# Patient Record
Sex: MALE | Race: WHITE | NOT HISPANIC OR LATINO | Employment: OTHER | URBAN - NONMETROPOLITAN AREA
[De-identification: names, ages, dates, MRNs, and addresses within clinical notes are randomized per-mention and may not be internally consistent; named-entity substitution may affect disease eponyms.]

---

## 2017-02-16 ENCOUNTER — TELEPHONE (OUTPATIENT)
Dept: NEUROLOGY | Facility: CLINIC | Age: 57
End: 2017-02-16

## 2017-02-26 ENCOUNTER — HOSPITAL ENCOUNTER (EMERGENCY)
Facility: HOSPITAL | Age: 57
Discharge: HOME | End: 2017-02-27
Attending: FAMILY MEDICINE | Admitting: FAMILY MEDICINE

## 2017-02-26 ENCOUNTER — APPOINTMENT (OUTPATIENT)
Dept: GENERAL RADIOLOGY | Facility: HOSPITAL | Age: 57
End: 2017-02-26

## 2017-02-26 DIAGNOSIS — I95.9 HYPOTENSION, UNSPECIFIED HYPOTENSION TYPE: Primary | ICD-10-CM

## 2017-02-26 DIAGNOSIS — R07.9 CHEST PAIN, UNSPECIFIED TYPE: ICD-10-CM

## 2017-02-26 LAB
ALBUMIN SERPL-MCNC: 4.1 G/DL (ref 3.5–5)
ALBUMIN/GLOB SERPL: 1.3 G/DL (ref 1.1–2.5)
ALP SERPL-CCNC: 76 U/L (ref 24–120)
ALT SERPL W P-5'-P-CCNC: 25 U/L (ref 0–54)
ANION GAP SERPL CALCULATED.3IONS-SCNC: 10 MMOL/L (ref 4–13)
APTT PPP: 30.9 SECONDS (ref 24.1–34.8)
AST SERPL-CCNC: 24 U/L (ref 7–45)
BASOPHILS # BLD AUTO: 0.04 10*3/MM3 (ref 0–0.2)
BASOPHILS NFR BLD AUTO: 0.5 % (ref 0–2)
BILIRUB SERPL-MCNC: 0.4 MG/DL (ref 0.1–1)
BUN BLD-MCNC: 13 MG/DL (ref 5–21)
BUN/CREAT SERPL: 13.8 (ref 7–25)
CALCIUM SPEC-SCNC: 9.3 MG/DL (ref 8.4–10.4)
CHLORIDE SERPL-SCNC: 103 MMOL/L (ref 98–110)
CO2 SERPL-SCNC: 27 MMOL/L (ref 24–31)
CREAT BLD-MCNC: 0.94 MG/DL (ref 0.5–1.4)
D DIMER PPP FEU-MCNC: 0.27 MG/L (FEU) (ref 0–0.5)
DEPRECATED RDW RBC AUTO: 45.4 FL (ref 40–54)
EOSINOPHIL # BLD AUTO: 0.2 10*3/MM3 (ref 0–0.7)
EOSINOPHIL NFR BLD AUTO: 2.4 % (ref 0–4)
ERYTHROCYTE [DISTWIDTH] IN BLOOD BY AUTOMATED COUNT: 13.2 % (ref 12–15)
GFR SERPL CREATININE-BSD FRML MDRD: 83 ML/MIN/1.73
GLOBULIN UR ELPH-MCNC: 3.2 GM/DL
GLUCOSE BLD-MCNC: 116 MG/DL (ref 70–100)
HCT VFR BLD AUTO: 40.5 % (ref 40–52)
HGB BLD-MCNC: 13.5 G/DL (ref 14–18)
IMM GRANULOCYTES # BLD: 0 10*3/MM3 (ref 0–0.03)
IMM GRANULOCYTES NFR BLD: 0 % (ref 0–5)
INR PPP: 0.9 (ref 0.91–1.09)
LYMPHOCYTES # BLD AUTO: 2.82 10*3/MM3 (ref 0.72–4.86)
LYMPHOCYTES NFR BLD AUTO: 34.1 % (ref 15–45)
MCH RBC QN AUTO: 31 PG (ref 28–32)
MCHC RBC AUTO-ENTMCNC: 33.3 G/DL (ref 33–36)
MCV RBC AUTO: 92.9 FL (ref 82–95)
MONOCYTES # BLD AUTO: 0.48 10*3/MM3 (ref 0.19–1.3)
MONOCYTES NFR BLD AUTO: 5.8 % (ref 4–12)
NEUTROPHILS # BLD AUTO: 4.73 10*3/MM3 (ref 1.87–8.4)
NEUTROPHILS NFR BLD AUTO: 57.2 % (ref 39–78)
NT-PROBNP SERPL-MCNC: 70 PG/ML (ref 0–900)
PLATELET # BLD AUTO: 269 10*3/MM3 (ref 130–400)
PMV BLD AUTO: 10.2 FL (ref 6–12)
POTASSIUM BLD-SCNC: 4.1 MMOL/L (ref 3.5–5.3)
PROT SERPL-MCNC: 7.3 G/DL (ref 6.3–8.7)
PROTHROMBIN TIME: 12.4 SECONDS (ref 11.9–14.6)
RBC # BLD AUTO: 4.36 10*6/MM3 (ref 4.8–5.9)
SODIUM BLD-SCNC: 140 MMOL/L (ref 135–145)
TROPONIN I SERPL-MCNC: 0 NG/ML (ref 0–0.07)
WBC NRBC COR # BLD: 8.27 10*3/MM3 (ref 4.8–10.8)

## 2017-02-26 PROCEDURE — 80053 COMPREHEN METABOLIC PANEL: CPT | Performed by: FAMILY MEDICINE

## 2017-02-26 PROCEDURE — 96360 HYDRATION IV INFUSION INIT: CPT

## 2017-02-26 PROCEDURE — 85730 THROMBOPLASTIN TIME PARTIAL: CPT | Performed by: FAMILY MEDICINE

## 2017-02-26 PROCEDURE — 83880 ASSAY OF NATRIURETIC PEPTIDE: CPT | Performed by: FAMILY MEDICINE

## 2017-02-26 PROCEDURE — 85025 COMPLETE CBC W/AUTO DIFF WBC: CPT | Performed by: FAMILY MEDICINE

## 2017-02-26 PROCEDURE — 85379 FIBRIN DEGRADATION QUANT: CPT | Performed by: FAMILY MEDICINE

## 2017-02-26 PROCEDURE — 93010 ELECTROCARDIOGRAM REPORT: CPT | Performed by: INTERNAL MEDICINE

## 2017-02-26 PROCEDURE — 96361 HYDRATE IV INFUSION ADD-ON: CPT

## 2017-02-26 PROCEDURE — 93005 ELECTROCARDIOGRAM TRACING: CPT

## 2017-02-26 PROCEDURE — 84484 ASSAY OF TROPONIN QUANT: CPT

## 2017-02-26 PROCEDURE — 71010 HC CHEST PA OR AP: CPT

## 2017-02-26 PROCEDURE — 93005 ELECTROCARDIOGRAM TRACING: CPT | Performed by: FAMILY MEDICINE

## 2017-02-26 PROCEDURE — 85610 PROTHROMBIN TIME: CPT | Performed by: FAMILY MEDICINE

## 2017-02-26 PROCEDURE — 99284 EMERGENCY DEPT VISIT MOD MDM: CPT

## 2017-02-26 RX ORDER — ACETAMINOPHEN 80 MG/1
243 TABLET, CHEWABLE ORAL ONCE
Status: DISCONTINUED | OUTPATIENT
Start: 2017-02-26 | End: 2017-02-26

## 2017-02-26 RX ORDER — ASPIRIN 81 MG/1
243 TABLET, CHEWABLE ORAL ONCE
Status: COMPLETED | OUTPATIENT
Start: 2017-02-26 | End: 2017-02-26

## 2017-02-26 RX ADMIN — ASPIRIN 81 MG 243 MG: 81 TABLET ORAL at 22:19

## 2017-02-26 RX ADMIN — SODIUM CHLORIDE 1000 ML: 9 INJECTION, SOLUTION INTRAVENOUS at 23:40

## 2017-02-27 VITALS
TEMPERATURE: 97.5 F | OXYGEN SATURATION: 97 % | BODY MASS INDEX: 23.11 KG/M2 | RESPIRATION RATE: 16 BRPM | WEIGHT: 156 LBS | HEIGHT: 69 IN | HEART RATE: 52 BPM | DIASTOLIC BLOOD PRESSURE: 79 MMHG | SYSTOLIC BLOOD PRESSURE: 128 MMHG

## 2017-02-27 LAB
HOLD SPECIMEN: NORMAL
HOLD SPECIMEN: NORMAL
TROPONIN I SERPL-MCNC: 0 NG/ML (ref 0–0.07)
WHOLE BLOOD HOLD SPECIMEN: NORMAL
WHOLE BLOOD HOLD SPECIMEN: NORMAL

## 2017-02-27 PROCEDURE — 93010 ELECTROCARDIOGRAM REPORT: CPT | Performed by: INTERNAL MEDICINE

## 2017-02-27 PROCEDURE — 84484 ASSAY OF TROPONIN QUANT: CPT

## 2017-02-27 RX ORDER — ATENOLOL 25 MG/1
12.5 TABLET ORAL DAILY
Qty: 30 TABLET | Refills: 0 | Status: SHIPPED | OUTPATIENT
Start: 2017-02-27

## 2017-03-03 RX ORDER — LEVETIRACETAM 1000 MG/1
TABLET ORAL
Qty: 60 TABLET | Refills: 5 | Status: SHIPPED | OUTPATIENT
Start: 2017-03-03 | End: 2017-11-28 | Stop reason: SDUPTHER

## 2017-04-06 ENCOUNTER — OFFICE VISIT (OUTPATIENT)
Dept: NEUROLOGY | Facility: CLINIC | Age: 57
End: 2017-04-06

## 2017-04-06 ENCOUNTER — LAB (OUTPATIENT)
Dept: LAB | Facility: HOSPITAL | Age: 57
End: 2017-04-06

## 2017-04-06 VITALS
SYSTOLIC BLOOD PRESSURE: 126 MMHG | DIASTOLIC BLOOD PRESSURE: 100 MMHG | HEIGHT: 69 IN | HEART RATE: 72 BPM | WEIGHT: 154 LBS | RESPIRATION RATE: 16 BRPM | BODY MASS INDEX: 22.81 KG/M2

## 2017-04-06 DIAGNOSIS — G47.33 OSA (OBSTRUCTIVE SLEEP APNEA): ICD-10-CM

## 2017-04-06 DIAGNOSIS — M79.604 PAIN IN BOTH LOWER EXTREMITIES: ICD-10-CM

## 2017-04-06 DIAGNOSIS — G40.309 GENERALIZED SEIZURE DISORDER (HCC): Primary | ICD-10-CM

## 2017-04-06 DIAGNOSIS — M79.605 PAIN IN BOTH LOWER EXTREMITIES: ICD-10-CM

## 2017-04-06 DIAGNOSIS — Z72.0 TOBACCO ABUSE: ICD-10-CM

## 2017-04-06 DIAGNOSIS — E78.5 DYSLIPIDEMIA: ICD-10-CM

## 2017-04-06 DIAGNOSIS — G60.9 IDIOPATHIC PERIPHERAL NEUROPATHY: ICD-10-CM

## 2017-04-06 DIAGNOSIS — G40.309 GENERALIZED SEIZURE DISORDER (HCC): ICD-10-CM

## 2017-04-06 LAB
ALBUMIN SERPL-MCNC: 4.6 G/DL (ref 3.5–5)
ALBUMIN/GLOB SERPL: 1.3 G/DL (ref 1.1–2.5)
ALP SERPL-CCNC: 83 U/L (ref 24–120)
ALT SERPL W P-5'-P-CCNC: 23 U/L (ref 0–54)
ANION GAP SERPL CALCULATED.3IONS-SCNC: 12 MMOL/L (ref 4–13)
AST SERPL-CCNC: 28 U/L (ref 7–45)
BASOPHILS # BLD AUTO: 0.03 10*3/MM3 (ref 0–0.2)
BASOPHILS NFR BLD AUTO: 0.3 % (ref 0–2)
BILIRUB SERPL-MCNC: 0.3 MG/DL (ref 0.1–1)
BUN BLD-MCNC: 15 MG/DL (ref 5–21)
BUN/CREAT SERPL: 16.5 (ref 7–25)
CALCIUM SPEC-SCNC: 9.6 MG/DL (ref 8.4–10.4)
CHLORIDE SERPL-SCNC: 99 MMOL/L (ref 98–110)
CO2 SERPL-SCNC: 30 MMOL/L (ref 24–31)
CREAT BLD-MCNC: 0.91 MG/DL (ref 0.5–1.4)
DEPRECATED RDW RBC AUTO: 45 FL (ref 40–54)
EOSINOPHIL # BLD AUTO: 0.27 10*3/MM3 (ref 0–0.7)
EOSINOPHIL NFR BLD AUTO: 2.9 % (ref 0–4)
ERYTHROCYTE [DISTWIDTH] IN BLOOD BY AUTOMATED COUNT: 13.3 % (ref 12–15)
GFR SERPL CREATININE-BSD FRML MDRD: 86 ML/MIN/1.73
GLOBULIN UR ELPH-MCNC: 3.5 GM/DL
GLUCOSE BLD-MCNC: 101 MG/DL (ref 70–100)
HCT VFR BLD AUTO: 43 % (ref 40–52)
HGB BLD-MCNC: 14.2 G/DL (ref 14–18)
IMM GRANULOCYTES # BLD: 0.01 10*3/MM3 (ref 0–0.03)
IMM GRANULOCYTES NFR BLD: 0.1 % (ref 0–5)
LYMPHOCYTES # BLD AUTO: 3.57 10*3/MM3 (ref 0.72–4.86)
LYMPHOCYTES NFR BLD AUTO: 38.6 % (ref 15–45)
MCH RBC QN AUTO: 30.7 PG (ref 28–32)
MCHC RBC AUTO-ENTMCNC: 33 G/DL (ref 33–36)
MCV RBC AUTO: 92.9 FL (ref 82–95)
MONOCYTES # BLD AUTO: 0.6 10*3/MM3 (ref 0.19–1.3)
MONOCYTES NFR BLD AUTO: 6.5 % (ref 4–12)
NEUTROPHILS # BLD AUTO: 4.76 10*3/MM3 (ref 1.87–8.4)
NEUTROPHILS NFR BLD AUTO: 51.6 % (ref 39–78)
PLATELET # BLD AUTO: 240 10*3/MM3 (ref 130–400)
PMV BLD AUTO: 10.4 FL (ref 6–12)
POTASSIUM BLD-SCNC: 4.1 MMOL/L (ref 3.5–5.3)
PROT SERPL-MCNC: 8.1 G/DL (ref 6.3–8.7)
RBC # BLD AUTO: 4.63 10*6/MM3 (ref 4.8–5.9)
SODIUM BLD-SCNC: 141 MMOL/L (ref 135–145)
WBC NRBC COR # BLD: 9.24 10*3/MM3 (ref 4.8–10.8)

## 2017-04-06 PROCEDURE — 80177 DRUG SCRN QUAN LEVETIRACETAM: CPT | Performed by: CLINICAL NURSE SPECIALIST

## 2017-04-06 PROCEDURE — 99214 OFFICE O/P EST MOD 30 MIN: CPT | Performed by: CLINICAL NURSE SPECIALIST

## 2017-04-06 PROCEDURE — 36415 COLL VENOUS BLD VENIPUNCTURE: CPT

## 2017-04-06 PROCEDURE — 85025 COMPLETE CBC W/AUTO DIFF WBC: CPT | Performed by: CLINICAL NURSE SPECIALIST

## 2017-04-06 PROCEDURE — 80053 COMPREHEN METABOLIC PANEL: CPT | Performed by: CLINICAL NURSE SPECIALIST

## 2017-04-06 RX ORDER — FEXOFENADINE HCL 180 MG/1
180 TABLET ORAL DAILY
COMMUNITY
Start: 2017-03-25

## 2017-04-06 RX ORDER — CITALOPRAM 20 MG/1
TABLET ORAL
Refills: 5 | COMMUNITY
Start: 2017-03-20 | End: 2017-11-07

## 2017-04-06 NOTE — PROGRESS NOTES
Subjective     Chief Complaint   Patient presents with   • Seizures     last episode was 2 weeks ago        Smith Montalvo is a 56 y.o. male right handed trying to get disability. He is here today for follow up for seizures. He is accompanied by his wife.Both are poor historians. He was last seen 12/2016.  He tells me he had done well until about 2 weeks ago when he was found on the ground by his son with a possible seizure and event is described below. He has history of noncomplinace and in July 2016 keppra level not detected. IT was repeated and showed eleveated keppra level of 58.   He has history of JOVAN but chooses not to wear CPAP.    He comes in today with new complaint of pain in his calves with exertion and relieved with rest. He has not had arterial studies done.  He states feels like his feet are cold all the time. Pain occurs when ambulating more than 10 consecutive minutes.    HPI Comments: About 2 weeks  Son found him outside on the ground, was light headed, did have left lateral tongue biting, no incontinence. After felt shaky, confused, HA. Denies missing doses of Keppra, denies being ill or sleep deprived.     Seizures    This is a chronic problem. Episode onset: last reported seizure March 2016. Associated symptoms include chest pain (heart cath a few months ago). Pertinent negatives include no confusion, no sore throat, no cough, no nausea, no vomiting and no diarrhea. Characteristics include bladder incontinence, rhythmic jerking and loss of consciousness. Characteristics do not include apnea.        Current Outpatient Prescriptions   Medication Sig Dispense Refill   • aspirin 81 MG EC tablet Take 81 mg by mouth daily.     • atenolol (TENORMIN) 25 MG tablet Take 0.5 tablets by mouth Daily. 30 tablet 0   • citalopram (CeleXA) 40 MG tablet Take 40 mg by mouth daily.     • cyclobenzaprine (FLEXERIL) 10 MG tablet Take 10 mg by mouth 3 (Three) Times a Day As Needed for muscle spasms.     • fexofenadine  (ALLEGRA) 180 MG tablet      • gabapentin (NEURONTIN) 100 MG capsule Take 100 mg by mouth 2 (two) times a day.     • levETIRAcetam (KEPPRA) 1000 MG tablet TAKE 1 TABLET BY MOUTH 2 TIMES A DAY 60 tablet 5   • midodrine (PROAMATINE) 2.5 MG tablet Take 2.5 mg by mouth 3 (three) times a day.  2   • nitroglycerin (NITROSTAT) 0.4 MG SL tablet Place 0.4 mg under the tongue every 5 (five) minutes as needed for chest pain. Take no more than 3 doses in 15 minutes.     • oxyCODONE-acetaminophen (PERCOCET) 7.5-325 MG per tablet Take 1 tablet by mouth 3 (three) times a day.     • pantoprazole (PROTONIX) 40 MG EC tablet Take 40 mg by mouth daily.  5   • simvastatin (ZOCOR) 20 MG tablet Take 20 mg by mouth every night.  5   • zolpidem (AMBIEN) 5 MG tablet Take 5 mg by mouth At Night As Needed for sleep.     • citalopram (CeleXA) 20 MG tablet TAKE 1 TABLET BY MOUTH DAILY  5   • clopidogrel (PLAVIX) 75 MG tablet Take 75 mg by mouth daily.  11   • meloxicam (MOBIC) 15 MG tablet Take 15 mg by mouth Daily.       No current facility-administered medications for this visit.        Past Medical History:   Diagnosis Date   • Arthritis    • Chronic back pain     [facility]   • Coronary artery disease    • Depression    • GERD (gastroesophageal reflux disease)    • Hyperlipidemia    • Hypertension    • Neuropathy    • Orthostatic hypotension    • JOVAN (obstructive sleep apnea)    • Seizures    • Sleep apnea     only uses nasal cannula at night-2-2.5L.       Past Surgical History:   Procedure Laterality Date   • AMPUTATION FINGER / THUMB Left    • CARDIAC CATHETERIZATION     • CARDIAC CATHETERIZATION N/A 10/4/2016    Procedure: Left Heart Cath;  Surgeon: Jair Henry MD;  Location:  [facility];  Service:    • CORONARY ANGIOPLASTY WITH STENT PLACEMENT     • EYE SURGERY Left    • HERNIA REPAIR     • SHOULDER SURGERY Left        family history is not on file.    Social History   Substance Use Topics  "  • Smoking status: Current Every Day Smoker     Packs/day: 0.25     Years: 32.00     Types: Cigarettes   • Smokeless tobacco: Never Used   • Alcohol use No       Review of Systems   Constitutional: Negative.  Negative for fatigue and fever.   HENT: Negative.  Negative for rhinorrhea and sore throat.    Eyes: Negative.    Respiratory: Negative.  Negative for apnea and cough.    Cardiovascular: Positive for chest pain (heart cath a few months ago).   Gastrointestinal: Negative.  Negative for constipation, diarrhea, nausea and vomiting.   Endocrine: Negative.    Genitourinary: Positive for bladder incontinence. Negative for dysuria.   Musculoskeletal: Negative for arthralgias. Gait problem: walks with rollerator.   Skin: Negative.    Allergic/Immunologic: Negative.    Neurological: Positive for dizziness, seizures, loss of consciousness, syncope and light-headedness (history of orhtostatic hypotension). Negative for weakness.   Hematological: Negative.  Negative for adenopathy.   Psychiatric/Behavioral: Negative.  Negative for agitation and confusion.        Episodes of yelling out during sleep occurring 1-2 times weekly lasting about 5 minutes. No inccontinence, no tongue biting. Does not always wear oxygen, does take ambien for sleep   All other systems reviewed and are negative.      Objective     /100 (BP Location: Left arm, Patient Position: Sitting, Cuff Size: Adult)  Pulse 72  Resp 16  Ht 69\" (175.3 cm)  Wt 154 lb (69.9 kg)  BMI 22.74 kg/m2, Body mass index is 22.74 kg/(m^2).    Physical Exam   Constitutional: He is oriented to person, place, and time. Vital signs are normal. He appears well-developed and well-nourished.   HENT:   Head: Normocephalic and atraumatic.   Mouth/Throat: Oropharynx is clear and moist and mucous membranes are normal. Abnormal dentition (missing front 2 bottom teeth).   Eyes: EOM and lids are normal. Pupils are equal, round, and reactive to light.   Neck: Trachea normal and " phonation normal. Neck supple. Carotid bruit is not present.   Cardiovascular: Normal rate, regular rhythm, S1 normal, S2 normal and normal heart sounds.    Pulses:       Dorsalis pedis pulses are 1+ on the right side, and 1+ on the left side.        Posterior tibial pulses are 2+ on the right side, and 2+ on the left side.   Pulmonary/Chest: Effort normal and breath sounds normal.   Abdominal: Soft. Bowel sounds are normal.   Musculoskeletal: Normal range of motion.       Neurological Sensory Findings - Altered hot/cold left ankle/foot discrimination.Unaltered hot/cold right ankle/foot discrimination. Altered sharp/dull left ankle/foot discrimination. Unaltered sharp/dull right ankle/foot discrimination.  Neurological: He is alert and oriented to person, place, and time. He has normal strength and normal reflexes. Tremors: intention tremor LUE. No cranial nerve deficit or sensory deficit. He displays a negative Romberg sign. Coordination (no ataxia, finger to nose intact) and gait normal.   Reflex Scores:       Tricep reflexes are 2+ on the right side and 2+ on the left side.       Bicep reflexes are 2+ on the right side and 2+ on the left side.       Brachioradialis reflexes are 2+ on the right side and 2+ on the left side.       Patellar reflexes are 2+ on the right side and 2+ on the left side.       Achilles reflexes are 2+ on the right side and 2+ on the left side.  Skin: Skin is warm and dry.   Psychiatric: He has a normal mood and affect. His speech is normal and behavior is normal. Cognition and memory are normal.   Nursing note and vitals reviewed.      Results for orders placed or performed during the hospital encounter of 02/26/17   Comprehensive Metabolic Panel   Result Value Ref Range    Glucose 116 (H) 70 - 100 mg/dL    BUN 13 5 - 21 mg/dL    Creatinine 0.94 0.50 - 1.40 mg/dL    Sodium 140 135 - 145 mmol/L    Potassium 4.1 3.5 - 5.3 mmol/L    Chloride 103 98 - 110 mmol/L    CO2 27.0 24.0 - 31.0 mmol/L     Calcium 9.3 8.4 - 10.4 mg/dL    Total Protein 7.3 6.3 - 8.7 g/dL    Albumin 4.10 3.50 - 5.00 g/dL    ALT (SGPT) 25 0 - 54 U/L    AST (SGOT) 24 7 - 45 U/L    Alkaline Phosphatase 76 24 - 120 U/L    Total Bilirubin 0.4 0.1 - 1.0 mg/dL    eGFR Non African Amer 83 >60 mL/min/1.73    Globulin 3.2 gm/dL    A/G Ratio 1.3 1.1 - 2.5 g/dL    BUN/Creatinine Ratio 13.8 7.0 - 25.0    Anion Gap 10.0 4.0 - 13.0 mmol/L   Protime-INR   Result Value Ref Range    Protime 12.4 11.9 - 14.6 Seconds    INR 0.90 (L) 0.91 - 1.09   aPTT   Result Value Ref Range    PTT 30.9 24.1 - 34.8 seconds   D-dimer, Quantitative   Result Value Ref Range    D-Dimer, Quantitative 0.27 0.00 - 0.50 mg/L (FEU)   BNP   Result Value Ref Range    proBNP 70.0 0.0 - 900.0 pg/mL   CBC Auto Differential   Result Value Ref Range    WBC 8.27 4.80 - 10.80 10*3/mm3    RBC 4.36 (L) 4.80 - 5.90 10*6/mm3    Hemoglobin 13.5 (L) 14.0 - 18.0 g/dL    Hematocrit 40.5 40.0 - 52.0 %    MCV 92.9 82.0 - 95.0 fL    MCH 31.0 28.0 - 32.0 pg    MCHC 33.3 33.0 - 36.0 g/dL    RDW 13.2 12.0 - 15.0 %    RDW-SD 45.4 40.0 - 54.0 fl    MPV 10.2 6.0 - 12.0 fL    Platelets 269 130 - 400 10*3/mm3    Neutrophil % 57.2 39.0 - 78.0 %    Lymphocyte % 34.1 15.0 - 45.0 %    Monocyte % 5.8 4.0 - 12.0 %    Eosinophil % 2.4 0.0 - 4.0 %    Basophil % 0.5 0.0 - 2.0 %    Immature Grans % 0.0 0.0 - 5.0 %    Neutrophils, Absolute 4.73 1.87 - 8.40 10*3/mm3    Lymphocytes, Absolute 2.82 0.72 - 4.86 10*3/mm3    Monocytes, Absolute 0.48 0.19 - 1.30 10*3/mm3    Eosinophils, Absolute 0.20 0.00 - 0.70 10*3/mm3    Basophils, Absolute 0.04 0.00 - 0.20 10*3/mm3    Immature Grans, Absolute 0.00 0.00 - 0.03 10*3/mm3   POC Troponin, Rapid   Result Value Ref Range    Troponin I 0.00 0.00 - 0.07 ng/mL   POC Troponin, Rapid   Result Value Ref Range    Troponin I 0.00 0.00 - 0.07 ng/mL   Light Blue Top   Result Value Ref Range    Extra Tube hold for add-on    Green Top (Gel)   Result Value Ref Range    Extra Tube Hold for  add-ons.    Lavender Top   Result Value Ref Range    Extra Tube hold for add-on    Red Top   Result Value Ref Range    Extra Tube Hold for add-ons.         ASSESSMENT/PLAN    Diagnoses and all orders for this visit:    Generalized seizure disorder  -     Vascular Screening (Peripheral Artery) CAR; Future  -     CBC & Differential; Future  -     Comprehensive Metabolic Panel; Future  -     Levetiracetam Level (Keppra); Future    Dyslipidemia  -     Vascular Screening (Peripheral Artery) CAR; Future  -     CBC & Differential; Future  -     Comprehensive Metabolic Panel; Future  -     Levetiracetam Level (Keppra); Future    Tobacco abuse  -     Vascular Screening (Peripheral Artery) CAR; Future  -     CBC & Differential; Future  -     Comprehensive Metabolic Panel; Future  -     Levetiracetam Level (Keppra); Future    JOVAN (obstructive sleep apnea)  Comments:  does not wear CPAP  Orders:  -     Vascular Screening (Peripheral Artery) CAR; Future  -     CBC & Differential; Future  -     Comprehensive Metabolic Panel; Future  -     Levetiracetam Level (Keppra); Future    Pain in both lower extremities, exertional  -     Vascular Screening (Peripheral Artery) CAR; Future  -     CBC & Differential; Future  -     Comprehensive Metabolic Panel; Future  -     Levetiracetam Level (Keppra); Future    Other orders  -     citalopram (CeleXA) 20 MG tablet; TAKE 1 TABLET BY MOUTH DAILY  -     fexofenadine (ALLEGRA) 180 MG tablet;     MEDICAL DECISION MAKIN. Obtain labs, cbc, cmp, keppra level  2. Obtain arterial studies lower extremities for exertional leg pain  3. Gabapentin prescribed by PCP.  4.Discussed tobacco cessation for greater than 3 minutes to include options for cessation and information given for support groups. All questions answered.  5.Seizure precautions were discussed to include no tub baths, no swimming, avoiding lack of sleep, and avoiding known triggers. Education given of things that may contribute to a  seizure to include, but not limited to: stressful situations, fever, fatigue, lack of sleep, low blood sugar, hyperventilation, flashing lights, and caffeine. Instructions given to take seizure medications as prescribed. Education given to family member on what to do during a seizure and care following the seizure. Education given to contact this office prior to stopping or changing any medications.  6. If arterial studies show abnormality requiring face to face will arrange when results received.      allergies and all known medications/prescriptions have been reviewed using resources available on this encounter.    Return in about 6 months (around 10/6/2017).        Joyce Maki, VARSHA

## 2017-04-10 DIAGNOSIS — M79.605 BILATERAL LOWER EXTREMITY PAIN: Primary | ICD-10-CM

## 2017-04-10 DIAGNOSIS — M79.604 BILATERAL LOWER EXTREMITY PAIN: Primary | ICD-10-CM

## 2017-04-11 LAB — LEVETIRACETAM SERPL-MCNC: 28.9 UG/ML (ref 10–40)

## 2017-10-17 ENCOUNTER — TELEPHONE (OUTPATIENT)
Dept: NEUROLOGY | Facility: CLINIC | Age: 57
End: 2017-10-17

## 2017-10-17 NOTE — TELEPHONE ENCOUNTER
He had received a letter about testing not completed. He did state that he would rather speak with Joyce about it at his follow up scheduled 11/7/17. I did let him know that I will cancel at his request. Hid did voice understanding.

## 2017-11-07 ENCOUNTER — LAB (OUTPATIENT)
Dept: LAB | Facility: HOSPITAL | Age: 57
End: 2017-11-07

## 2017-11-07 ENCOUNTER — OFFICE VISIT (OUTPATIENT)
Dept: NEUROLOGY | Facility: CLINIC | Age: 57
End: 2017-11-07

## 2017-11-07 VITALS
BODY MASS INDEX: 24.07 KG/M2 | SYSTOLIC BLOOD PRESSURE: 116 MMHG | DIASTOLIC BLOOD PRESSURE: 82 MMHG | RESPIRATION RATE: 18 BRPM | HEART RATE: 64 BPM | WEIGHT: 162.5 LBS | HEIGHT: 69 IN

## 2017-11-07 DIAGNOSIS — R53.83 FATIGUE, UNSPECIFIED TYPE: ICD-10-CM

## 2017-11-07 DIAGNOSIS — E78.5 DYSLIPIDEMIA: ICD-10-CM

## 2017-11-07 DIAGNOSIS — M79.604 PAIN IN BOTH LOWER EXTREMITIES: ICD-10-CM

## 2017-11-07 DIAGNOSIS — G47.33 OSA (OBSTRUCTIVE SLEEP APNEA): ICD-10-CM

## 2017-11-07 DIAGNOSIS — R41.3 MEMORY PROBLEM: ICD-10-CM

## 2017-11-07 DIAGNOSIS — G40.309 GENERALIZED SEIZURE DISORDER (HCC): Primary | ICD-10-CM

## 2017-11-07 DIAGNOSIS — Z72.0 TOBACCO ABUSE: ICD-10-CM

## 2017-11-07 DIAGNOSIS — G40.309 GENERALIZED SEIZURE DISORDER (HCC): ICD-10-CM

## 2017-11-07 DIAGNOSIS — M79.605 PAIN IN BOTH LOWER EXTREMITIES: ICD-10-CM

## 2017-11-07 LAB
ALBUMIN SERPL-MCNC: 4.2 G/DL (ref 3.5–5)
ALBUMIN/GLOB SERPL: 1.2 G/DL (ref 1.1–2.5)
ALP SERPL-CCNC: 90 U/L (ref 24–120)
ALT SERPL W P-5'-P-CCNC: 24 U/L (ref 0–54)
ANION GAP SERPL CALCULATED.3IONS-SCNC: 9 MMOL/L (ref 4–13)
AST SERPL-CCNC: 30 U/L (ref 7–45)
BASOPHILS # BLD AUTO: 0.04 10*3/MM3 (ref 0–0.2)
BASOPHILS NFR BLD AUTO: 0.6 % (ref 0–2)
BILIRUB SERPL-MCNC: 0.2 MG/DL (ref 0.1–1)
BUN BLD-MCNC: 14 MG/DL (ref 5–21)
BUN/CREAT SERPL: 17.1 (ref 7–25)
CALCIUM SPEC-SCNC: 9.5 MG/DL (ref 8.4–10.4)
CHLORIDE SERPL-SCNC: 102 MMOL/L (ref 98–110)
CO2 SERPL-SCNC: 30 MMOL/L (ref 24–31)
CREAT BLD-MCNC: 0.82 MG/DL (ref 0.5–1.4)
DEPRECATED RDW RBC AUTO: 46.6 FL (ref 40–54)
EOSINOPHIL # BLD AUTO: 0.2 10*3/MM3 (ref 0–0.7)
EOSINOPHIL NFR BLD AUTO: 3 % (ref 0–4)
ERYTHROCYTE [DISTWIDTH] IN BLOOD BY AUTOMATED COUNT: 14.2 % (ref 12–15)
FOLATE SERPL-MCNC: 11.1 NG/ML
GFR SERPL CREATININE-BSD FRML MDRD: 96 ML/MIN/1.73
GLOBULIN UR ELPH-MCNC: 3.4 GM/DL
GLUCOSE BLD-MCNC: 98 MG/DL (ref 70–100)
HCT VFR BLD AUTO: 42.3 % (ref 40–52)
HGB BLD-MCNC: 13.6 G/DL (ref 14–18)
IMM GRANULOCYTES # BLD: 0.02 10*3/MM3 (ref 0–0.03)
IMM GRANULOCYTES NFR BLD: 0.3 % (ref 0–5)
LYMPHOCYTES # BLD AUTO: 2.48 10*3/MM3 (ref 0.72–4.86)
LYMPHOCYTES NFR BLD AUTO: 36.7 % (ref 15–45)
MCH RBC QN AUTO: 29.4 PG (ref 28–32)
MCHC RBC AUTO-ENTMCNC: 32.2 G/DL (ref 33–36)
MCV RBC AUTO: 91.6 FL (ref 82–95)
MONOCYTES # BLD AUTO: 0.54 10*3/MM3 (ref 0.19–1.3)
MONOCYTES NFR BLD AUTO: 8 % (ref 4–12)
NEUTROPHILS # BLD AUTO: 3.48 10*3/MM3 (ref 1.87–8.4)
NEUTROPHILS NFR BLD AUTO: 51.4 % (ref 39–78)
PLATELET # BLD AUTO: 284 10*3/MM3 (ref 130–400)
PMV BLD AUTO: 10.1 FL (ref 6–12)
POTASSIUM BLD-SCNC: 4 MMOL/L (ref 3.5–5.3)
PROT SERPL-MCNC: 7.6 G/DL (ref 6.3–8.7)
RBC # BLD AUTO: 4.62 10*6/MM3 (ref 4.8–5.9)
SODIUM BLD-SCNC: 141 MMOL/L (ref 135–145)
TSH SERPL DL<=0.05 MIU/L-ACNC: 1.46 MIU/ML (ref 0.47–4.68)
VIT B12 BLD-MCNC: 462 PG/ML (ref 239–931)
WBC NRBC COR # BLD: 6.76 10*3/MM3 (ref 4.8–10.8)

## 2017-11-07 PROCEDURE — 36415 COLL VENOUS BLD VENIPUNCTURE: CPT

## 2017-11-07 PROCEDURE — 85025 COMPLETE CBC W/AUTO DIFF WBC: CPT | Performed by: CLINICAL NURSE SPECIALIST

## 2017-11-07 PROCEDURE — 80177 DRUG SCRN QUAN LEVETIRACETAM: CPT | Performed by: CLINICAL NURSE SPECIALIST

## 2017-11-07 PROCEDURE — 80053 COMPREHEN METABOLIC PANEL: CPT | Performed by: CLINICAL NURSE SPECIALIST

## 2017-11-07 PROCEDURE — 84207 ASSAY OF VITAMIN B-6: CPT | Performed by: CLINICAL NURSE SPECIALIST

## 2017-11-07 PROCEDURE — 99214 OFFICE O/P EST MOD 30 MIN: CPT | Performed by: CLINICAL NURSE SPECIALIST

## 2017-11-07 PROCEDURE — 82746 ASSAY OF FOLIC ACID SERUM: CPT | Performed by: CLINICAL NURSE SPECIALIST

## 2017-11-07 PROCEDURE — 82607 VITAMIN B-12: CPT | Performed by: CLINICAL NURSE SPECIALIST

## 2017-11-07 PROCEDURE — 84443 ASSAY THYROID STIM HORMONE: CPT | Performed by: CLINICAL NURSE SPECIALIST

## 2017-11-07 NOTE — PATIENT INSTRUCTIONS
Sleep Apnea  Sleep apnea is a condition that affects breathing. People with sleep apnea have moments during sleep when their breathing pauses briefly or gets shallow. Sleep apnea can cause these symptoms:  · Trouble staying asleep.  · Sleepiness or tiredness during the day.  · Irritability.  · Loud snoring.  · Morning headaches.  · Trouble concentrating.  · Forgetting things.  · Less interest in sex.  · Being sleepy for no reason.  · Mood swings.  · Personality changes.  · Depression.  · Waking up a lot during the night to pee (urinate).  · Dry mouth.  · Sore throat.  HOME CARE  · Make any changes in your routine that your doctor recommends.  · Eat a healthy, well-balanced diet.  · Take over-the-counter and prescription medicines only as told by your doctor.  · Avoid using alcohol, calming medicines (sedatives), and narcotic medicines.  · Take steps to lose weight if you are overweight.  · If you were given a machine (device) to use while you sleep, use it only as told by your doctor.  · Do not use any tobacco products, such as cigarettes, chewing tobacco, and e-cigarettes. If you need help quitting, ask your doctor.  · Keep all follow-up visits as told by your doctor. This is important.  GET HELF IF:  · The machine that you were given to use during sleep is uncomfortable or does not seem to be working.  · Your symptoms do not get better.  · Your symptoms get worse.  GET HELP RIGHT AWAY IF:  · Your chest hurts.  · You have trouble breathing in enough air (shortness of breath).  · You have an uncomfortable feeling in your back, arms, or stomach.  · You have trouble talking.  · One side of your body feels weak.  · A part of your face is hanging down (drooping).  These symptoms may be an emergency. Do not wait to see if the symptoms will go away. Get medical help right away. Call your local emergency services (911 in the [place] Do not drive yourself to the hospital.     This information is not intended to replace  "advice given to you by your health care provider. Make sure you discuss any questions you have with your health care provider.     Document Released: 09/26/2009 Document Revised: 04/10/2017 Document Reviewed: 09/26/2016  [employer] Interactive Patient Education ©2017 [employer]  You Can Quit Smoking  If you are ready to quit smoking or are thinking about it, congratulations! You have chosen to help yourself be healthier and live longer! There are lots of different ways to quit smoking. Nicotine gum, nicotine patches, a nicotine inhaler, or nicotine nasal spray can help with physical craving. Hypnosis, support groups, and medicines help break the habit of smoking.  TIPS TO GET OFF AND STAY OFF CIGARETTES  · Learn to predict your moods. Do not let a bad situation be your excuse to have a cigarette. Some situations in your life might tempt you to have a cigarette.  · Ask friends and co-workers not to smoke around you.  · Make your home smoke-free.  · Never have \"just one\" cigarette. It leads to wanting another and another. Remind yourself of your decision to quit.  · On a card, make a list of your reasons for not smoking. Read it at least the same number of times a day as you have a cigarette. Tell yourself everyday, \"I do not want to smoke. I choose not to smoke.\"  · Ask someone at home or work to help you with your plan to quit smoking.  · Have something planned after you eat or have a cup of coffee. Take a walk or get other exercise to perk you up. This will help to keep you from overeating.  · Try a relaxation exercise to calm you down and decrease your stress. Remember, you may be tense and nervous the first two weeks after you quit. This will pass.  · Find new activities to keep your hands busy. Play with a pen, coin, or rubber band. Doodle or draw things on paper.  · Brush your teeth right after eating. This will help cut down the craving for the taste of tobacco after meals. You can try mouthwash too.  · Try " "gum, breath mints, or diet candy to keep something in your mouth.  IF YOU SMOKE AND WANT TO QUIT:  · Do not stock up on cigarettes. Never buy a carton. Wait until one pack is finished before you buy another.  · Never carry cigarettes with you at work or at home.  · Keep cigarettes as far away from you as possible. Leave them with someone else.  · Never carry matches or a lighter with you.  · Ask yourself, \"Do I need this cigarette or is this just a reflex?\"  · Bet with someone that you can quit. Put cigarette money in a [employer] bank every morning. If you smoke, you give up the money. If you do not smoke, by the end of the week, you keep the money.  · Keep trying. It takes 21 days to change a habit!  · Talk to your doctor about using medicines to help you quit. These include nicotine replacement gum, lozenges, or skin patches.     This information is not intended to replace advice given to you by your health care provider. Make sure you discuss any questions you have with your health care provider.     Document Released: 10/14/2010 Document Revised: 03/11/2013 Document Reviewed: 05/03/2016  [employer] Interactive Patient Education ©2017 [employer]  Epilepsy  Epilepsy is a disorder in which a person has repeated seizures over time. A seizure is a release of abnormal electrical activity in the brain. Seizures can cause a change in attention, behavior, or the ability to remain awake and alert (altered mental status). Seizures often involve uncontrollable shaking (convulsions).   Most people with epilepsy lead normal lives. However, people with epilepsy are at an increased risk of falls, accidents, and injuries. Therefore, it is important to begin treatment right away.  CAUSES   Epilepsy has many possible causes. Anything that disturbs the normal pattern of brain cell activity can lead to seizures. This may include:   · Head injury.  · Birth trauma.  · High fever as a child.  · Stroke.  · Bleeding into or around the " brain.  · Certain drugs.  · Prolonged low oxygen, such as what occurs after CPR efforts.  · Abnormal brain development.  · Certain illnesses, such as meningitis, encephalitis (brain infection), malaria, and other infections.  · An imbalance of nerve signaling chemicals (neurotransmitters).    SIGNS AND SYMPTOMS   The symptoms of a seizure can vary greatly from one person to another. Right before a seizure, you may have a warning (aura) that a seizure is about to occur. An aura may include the following symptoms:  · Fear or anxiety.  · Nausea.  · Feeling like the room is spinning (vertigo).  · Vision changes, such as seeing flashing lights or spots.  Common symptoms during a seizure include:  · Abnormal sensations, such as an abnormal smell or a bitter taste in the mouth.    · Sudden, general body stiffness.    · Convulsions that involve rhythmic jerking of the face, arm, or leg on one or both sides.    · Sudden change in consciousness.      Appearing to be awake but not responding.      Appearing to be asleep but cannot be awakened.    · Grimacing, chewing, lip smacking, drooling, tongue biting, or loss of bowel or bladder control.  After a seizure, you may feel sleepy for a while.   DIAGNOSIS   Your health care provider will ask about your symptoms and take a medical history. Descriptions from any witnesses to your seizures will be very helpful in the diagnosis. A physical exam, including a detailed neurological exam, is necessary. Various tests may be done, such as:   · An electroencephalogram (EEG). This is a painless test of your brain waves. In this test, a diagram is created of your brain waves. These diagrams can be interpreted by a specialist.  · An MRI of the brain.    · A CT scan of the brain.    · A spinal tap (lumbar puncture, LP).  · Blood tests to check for signs of infection or abnormal blood chemistry.  TREATMENT   There is no cure for epilepsy, but it is generally treatable. Once epilepsy is  diagnosed, it is important to begin treatment as soon as possible. For most people with epilepsy, seizures can be controlled with medicines. The following may also be used:  · A pacemaker for the brain (vagus nerve stimulator) can be used for people with seizures that are not well controlled by medicine.  · Surgery on the brain.  For some people, epilepsy eventually goes away.  HOME CARE INSTRUCTIONS   ·  Follow your health care provider's recommendations on driving and safety in normal activities.  · Get enough rest. Lack of sleep can cause seizures.  · Only take over-the-counter or prescription medicines as directed by your health care provider. Take any prescribed medicine exactly as directed.  · Avoid any known triggers of your seizures.  · Keep a seizure diary. Record what you recall about any seizure, especially any possible trigger.    · Make sure the people you live and work with know that you are prone to seizures. They should receive instructions on how to help you. In general, a witness to a seizure should:      Cushion your head and body.      Turn you on your side.      Avoid unnecessarily restraining you.      Not place anything inside your mouth.      Call for emergency medical help if there is any question about what has occurred.    · Follow up with your health care provider as directed. You may need regular blood tests to monitor the levels of your medicine.    SEEK MEDICAL CARE IF:   · You develop signs of infection or other illness. This might increase the risk of a seizure.    · You seem to be having more frequent seizures.    · Your seizure pattern is changing.    SEEK IMMEDIATE MEDICAL CARE IF:   · You have a seizure that does not stop after a few moments.    · You have a seizure that causes any difficulty in breathing.    · You have a seizure that results in a very severe headache.    · You have a seizure that leaves you with the inability to speak or use a part of your body.       This  information is not intended to replace advice given to you by your health care provider. Make sure you discuss any questions you have with your health care provider.     Document Released: 12/18/2006 Document Revised: 04/10/2017 Document Reviewed: 07/30/2014  [employer] Interactive Patient Education ©2017 [employer]

## 2017-11-07 NOTE — PROGRESS NOTES
Subjective     Chief Complaint   Patient presents with   • Seizures     6 month f/u.  Pt states that he hasn't had any seizures, but has times where things are fuzzy and maybe dizzy.  He states that he is getting a dull headache on the L side of his head 2 times per week.         Smith Montalvo is a 57 y.o. male right handed trying to get disability. He is here today for follow up for seizures. He is accompanied by his wife.Both are poor historians. He was last seen 4/2017.  At that time was complaining of exertional pain in his calves. He was to have arterial studies, but was not done.He states he was never notified of appointment.  He continues to have exertional leg pain. He has not seen his PCP.  The patient does see pain management for chronic cervicalgia.  Also has history of JOVAN and wears oxygen 2L/m pnc. He denies seizure, staring spell, involuntary tremor, or incontinence. He denies missing doses.  He does have a new complaint of memory problems. He can drive without getting lost. He can manage money but he does not manage finance as his wife has done this for many years. He states mostly memory problems is remembering what has done from one day to the next. MMSE TODAY 30/30. He also complains of fatigue. He tells me he is trying to quit smoking.    HPI Comments: About 2 weeks  Son found him outside on the ground, was light headed, did have left lateral tongue biting, no incontinence. After felt shaky, confused, HA. Denies missing doses of Keppra, denies being ill or sleep deprived.     Seizures    This is a chronic problem. Episode onset: last reported seizure 3/2017. Associated symptoms include chest pain (heart cath a few months ago). Pertinent negatives include no confusion, no sore throat, no cough, no nausea, no vomiting and no diarrhea. Characteristics include bladder incontinence, rhythmic jerking and loss of consciousness. Characteristics do not include apnea.   Memory Loss   This is a new problem.  The current episode started more than 1 month ago. The problem occurs daily. The problem has been unchanged. Associated symptoms include chest pain (heart cath a few months ago). Pertinent negatives include no arthralgias, coughing, fatigue, fever, nausea, sore throat, vomiting or weakness. Associated symptoms comments: Difficulty remembering daily activities from one day to the next. Exacerbated by: seizure disorder, tobacco abuse, JOVAN. He has tried nothing for the symptoms. The treatment provided no relief.        Current Outpatient Prescriptions   Medication Sig Dispense Refill   • aspirin 81 MG EC tablet Take 81 mg by mouth daily.     • atenolol (TENORMIN) 25 MG tablet Take 0.5 tablets by mouth Daily. 30 tablet 0   • citalopram (CeleXA) 40 MG tablet Take 40 mg by mouth daily.     • cyclobenzaprine (FLEXERIL) 10 MG tablet Take 10 mg by mouth 3 (Three) Times a Day As Needed for muscle spasms.     • fexofenadine (ALLEGRA) 180 MG tablet      • gabapentin (NEURONTIN) 100 MG capsule Take 100 mg by mouth 2 (two) times a day.     • levETIRAcetam (KEPPRA) 1000 MG tablet TAKE 1 TABLET BY MOUTH 2 TIMES A DAY 60 tablet 5   • meloxicam (MOBIC) 15 MG tablet Take 15 mg by mouth Daily.     • midodrine (PROAMATINE) 2.5 MG tablet Take 2.5 mg by mouth 3 (three) times a day.  2   • nitroglycerin (NITROSTAT) 0.4 MG SL tablet Place 0.4 mg under the tongue every 5 (five) minutes as needed for chest pain. Take no more than 3 doses in 15 minutes.     • oxyCODONE-acetaminophen (PERCOCET) 7.5-325 MG per tablet Take 1 tablet by mouth 3 (three) times a day.     • pantoprazole (PROTONIX) 40 MG EC tablet Take 40 mg by mouth daily.  5   • simvastatin (ZOCOR) 20 MG tablet Take 20 mg by mouth every night.  5   • zolpidem (AMBIEN) 5 MG tablet Take 5 mg by mouth At Night As Needed for sleep.       No current facility-administered medications for this visit.        Past Medical History:   Diagnosis Date   • Arthritis    • Chronic back pain      [facility]   • Coronary artery disease    • Depression    • GERD (gastroesophageal reflux disease)    • Hyperlipidemia    • Hypertension    • Neuropathy    • Orthostatic hypotension    • JOVAN (obstructive sleep apnea)    • Seizures    • Sleep apnea     only uses nasal cannula at night-2-2.5L.       Past Surgical History:   Procedure Laterality Date   • AMPUTATION FINGER / THUMB Left    • CARDIAC CATHETERIZATION     • CARDIAC CATHETERIZATION N/A 10/4/2016    Procedure: Left Heart Cath;  Surgeon: Jair Henry MD;  Location:  [facility]ATH INVASIVE LOCATION;  Service:    • CORONARY ANGIOPLASTY WITH STENT PLACEMENT     • EYE SURGERY Left    • HERNIA REPAIR     • SHOULDER SURGERY Left        family history includes No Known Problems in his father and mother.    Social History   Substance Use Topics   • Smoking status: Current Every Day Smoker     Packs/day: 0.25     Years: 32.00     Types: Cigarettes   • Smokeless tobacco: Never Used   • Alcohol use No       Review of Systems   Constitutional: Negative.  Negative for fatigue and fever.   HENT: Negative.  Negative for rhinorrhea and sore throat.    Eyes: Negative.    Respiratory: Negative.  Negative for apnea and cough.    Cardiovascular: Positive for chest pain (heart cath a few months ago).   Gastrointestinal: Negative.  Negative for constipation, diarrhea, nausea and vomiting.   Endocrine: Negative.    Genitourinary: Positive for bladder incontinence. Negative for dysuria.   Musculoskeletal: Negative for arthralgias. Gait problem: walks with rollerator.   Skin: Negative.    Allergic/Immunologic: Negative.    Neurological: Positive for dizziness, seizures, loss of consciousness, syncope and light-headedness (history of orhtostatic hypotension). Negative for weakness.   Hematological: Negative.  Negative for adenopathy.   Psychiatric/Behavioral: Negative.  Negative for agitation and confusion.        Episodes of yelling out during sleep occurring  "1-2 times weekly lasting about 5 minutes. No inccontinence, no tongue biting. Does not always wear oxygen, does take ambien for sleep   All other systems reviewed and are negative.      Objective     /82  Pulse 64  Resp 18  Ht 69\" (175.3 cm)  Wt 162 lb 8 oz (73.7 kg)  BMI 24 kg/m2, Body mass index is 24 kg/(m^2).    Physical Exam   Constitutional: He is oriented to person, place, and time. Vital signs are normal. He appears well-developed and well-nourished.   HENT:   Head: Normocephalic and atraumatic.   Right Ear: External ear normal.   Left Ear: External ear normal.   Nose: Nose normal.   Mouth/Throat: Oropharynx is clear and moist and mucous membranes are normal. Abnormal dentition (missing front 2 bottom teeth).   Eyes: Conjunctivae, EOM and lids are normal. Pupils are equal, round, and reactive to light.   Neck: Trachea normal, normal range of motion and phonation normal. Neck supple. Carotid bruit is not present.   Cardiovascular: Normal rate, regular rhythm, S1 normal, S2 normal and normal heart sounds.    Pulses:       Dorsalis pedis pulses are 1+ on the right side, and 1+ on the left side.        Posterior tibial pulses are 2+ on the right side, and 2+ on the left side.   Pulmonary/Chest: Effort normal and breath sounds normal.   Abdominal: Soft. Bowel sounds are normal.   Musculoskeletal: Normal range of motion.       Neurological Sensory Findings - Altered hot/cold left ankle/foot discrimination.Unaltered hot/cold right ankle/foot discrimination. Altered sharp/dull left ankle/foot discrimination. Unaltered sharp/dull right ankle/foot discrimination.  Neurological: He is alert and oriented to person, place, and time. He has normal strength and normal reflexes. Tremors: intention tremor LUE. No cranial nerve deficit or sensory deficit. He displays a negative Romberg sign. Coordination (no ataxia, finger to nose intact) and gait normal.   Reflex Scores:       Tricep reflexes are 2+ on the right " side and 2+ on the left side.       Bicep reflexes are 2+ on the right side and 2+ on the left side.       Brachioradialis reflexes are 2+ on the right side and 2+ on the left side.       Patellar reflexes are 2+ on the right side and 2+ on the left side.       Achilles reflexes are 2+ on the right side and 2+ on the left side.  Skin: Skin is warm and dry.   Psychiatric: He has a normal mood and affect. His speech is normal and behavior is normal. Cognition and memory are normal.   Nursing note and vitals reviewed.      Results for orders placed or performed in visit on 04/06/17   Comprehensive Metabolic Panel   Result Value Ref Range    Glucose 101 (H) 70 - 100 mg/dL    BUN 15 5 - 21 mg/dL    Creatinine 0.91 0.50 - 1.40 mg/dL    Sodium 141 135 - 145 mmol/L    Potassium 4.1 3.5 - 5.3 mmol/L    Chloride 99 98 - 110 mmol/L    CO2 30.0 24.0 - 31.0 mmol/L    Calcium 9.6 8.4 - 10.4 mg/dL    Total Protein 8.1 6.3 - 8.7 g/dL    Albumin 4.60 3.50 - 5.00 g/dL    ALT (SGPT) 23 0 - 54 U/L    AST (SGOT) 28 7 - 45 U/L    Alkaline Phosphatase 83 24 - 120 U/L    Total Bilirubin 0.3 0.1 - 1.0 mg/dL    eGFR Non African Amer 86 >60 mL/min/1.73    Globulin 3.5 gm/dL    A/G Ratio 1.3 1.1 - 2.5 g/dL    BUN/Creatinine Ratio 16.5 7.0 - 25.0    Anion Gap 12.0 4.0 - 13.0 mmol/L   Levetiracetam Level (Keppra)   Result Value Ref Range    Levetiracetam 28.9 10.0 - 40.0 ug/mL   CBC Auto Differential   Result Value Ref Range    WBC 9.24 4.80 - 10.80 10*3/mm3    RBC 4.63 (L) 4.80 - 5.90 10*6/mm3    Hemoglobin 14.2 14.0 - 18.0 g/dL    Hematocrit 43.0 40.0 - 52.0 %    MCV 92.9 82.0 - 95.0 fL    MCH 30.7 28.0 - 32.0 pg    MCHC 33.0 33.0 - 36.0 g/dL    RDW 13.3 12.0 - 15.0 %    RDW-SD 45.0 40.0 - 54.0 fl    MPV 10.4 6.0 - 12.0 fL    Platelets 240 130 - 400 10*3/mm3    Neutrophil % 51.6 39.0 - 78.0 %    Lymphocyte % 38.6 15.0 - 45.0 %    Monocyte % 6.5 4.0 - 12.0 %    Eosinophil % 2.9 0.0 - 4.0 %    Basophil % 0.3 0.0 - 2.0 %    Immature Grans % 0.1  0.0 - 5.0 %    Neutrophils, Absolute 4.76 1.87 - 8.40 10*3/mm3    Lymphocytes, Absolute 3.57 0.72 - 4.86 10*3/mm3    Monocytes, Absolute 0.60 0.19 - 1.30 10*3/mm3    Eosinophils, Absolute 0.27 0.00 - 0.70 10*3/mm3    Basophils, Absolute 0.03 0.00 - 0.20 10*3/mm3    Immature Grans, Absolute 0.01 0.00 - 0.03 10*3/mm3        ASSESSMENT/PLAN    Diagnoses and all orders for this visit:    Generalized seizure disorder  -     CBC & Differential; Future  -     Comprehensive Metabolic Panel; Future  -     Vitamin B12; Future  -     Folate; Future  -     TSH; Future  -     Levetiracetam Level (Keppra); Future  -     Vitamin B6 (Pyridoxine); Future  -     MRI Brain Without Contrast; Future  -     Ambulatory Referral to Speech Therapy    JOVAN (obstructive sleep apnea)  -     Overnight Sleep Oximetry Study; Future    Dyslipidemia    Tobacco abuse    Fatigue, unspecified type  -     CBC & Differential; Future  -     Comprehensive Metabolic Panel; Future  -     Vitamin B12; Future  -     Folate; Future  -     TSH; Future  -     Levetiracetam Level (Keppra); Future  -     Vitamin B6 (Pyridoxine); Future  -     MRI Brain Without Contrast; Future  -     Ambulatory Referral to Speech Therapy    Memory problem  -     CBC & Differential; Future  -     Comprehensive Metabolic Panel; Future  -     Vitamin B12; Future  -     Folate; Future  -     TSH; Future  -     Levetiracetam Level (Keppra); Future  -     Vitamin B6 (Pyridoxine); Future  -     MRI Brain Without Contrast; Future  -     Ambulatory Referral to Speech Therapy    Pain in both lower extremities, exertional  -     US arterial doppler lower extremity  bilateral; Future    MEDICAL DECISION MAKIN. Obtain labs, cbc, cmp, keppra level, b12, folate, B6, TSH  2. Try again to Obtain arterial studies lower extremities for exertional leg pain  3. Gabapentin prescribed by PCP/pain management  4.Discussed tobacco cessation for greater than 3 minutes to include options for cessation  and information given for support groups. All questions answered.  5.Seizure precautions were discussed to include no tub baths, no swimming, avoiding lack of sleep, and avoiding known triggers. Education given of things that may contribute to a seizure to include, but not limited to: stressful situations, fever, fatigue, lack of sleep, low blood sugar, hyperventilation, flashing lights, and caffeine. Instructions given to take seizure medications as prescribed. Education given to family member on what to do during a seizure and care following the seizure. Education given to contact this office prior to stopping or changing any medications.  6.will obtain overnight pulse ox on 2L/m pnc  7. Refer to  for memory evaluation  8. Obtain MRI brain   9. Patient request tests be done at [facility] as has financial burden for transportation to [place].  10. Healthy BMI   allergies and all known medications/prescriptions have been reviewed using resources available on this encounter.    Return in about 6 weeks (around 12/19/2017).        VARSHA Franco

## 2017-11-10 DIAGNOSIS — R53.83 FATIGUE, UNSPECIFIED TYPE: ICD-10-CM

## 2017-11-10 DIAGNOSIS — G40.309 GENERALIZED SEIZURE DISORDER (HCC): ICD-10-CM

## 2017-11-10 DIAGNOSIS — R41.3 MEMORY PROBLEM: ICD-10-CM

## 2017-11-10 LAB
LEVETIRACETAM SERPL-MCNC: 38 UG/ML (ref 10–40)
VIT B6 SERPL-MCNC: 7.4 UG/L (ref 5.3–46.7)

## 2017-11-15 DIAGNOSIS — M79.605 BILATERAL LOWER EXTREMITY PAIN: Primary | ICD-10-CM

## 2017-11-15 DIAGNOSIS — M79.604 BILATERAL LOWER EXTREMITY PAIN: Primary | ICD-10-CM

## 2017-11-15 DIAGNOSIS — R09.89 OTHER SPECIFIED SYMPTOMS AND SIGNS INVOLVING THE CIRCULATORY AND RESPIRATORY SYSTEMS: ICD-10-CM

## 2017-11-16 ENCOUNTER — HOSPITAL ENCOUNTER (OUTPATIENT)
Dept: ULTRASOUND IMAGING | Facility: HOSPITAL | Age: 57
Discharge: HOME | End: 2017-11-16
Admitting: CLINICAL NURSE SPECIALIST

## 2017-11-16 ENCOUNTER — HOSPITAL ENCOUNTER (OUTPATIENT)
Dept: SPEECH THERAPY | Facility: HOSPITAL | Age: 57
Setting detail: THERAPIES SERIES
Discharge: HOME | End: 2017-11-16

## 2017-11-16 DIAGNOSIS — M79.604 BILATERAL LOWER EXTREMITY PAIN: ICD-10-CM

## 2017-11-16 DIAGNOSIS — R41.3 MEMORY CHANGE: Primary | ICD-10-CM

## 2017-11-16 DIAGNOSIS — M79.605 BILATERAL LOWER EXTREMITY PAIN: ICD-10-CM

## 2017-11-16 DIAGNOSIS — R09.89 OTHER SPECIFIED SYMPTOMS AND SIGNS INVOLVING THE CIRCULATORY AND RESPIRATORY SYSTEMS: ICD-10-CM

## 2017-11-16 PROCEDURE — 93925 LOWER EXTREMITY STUDY: CPT | Performed by: SURGERY

## 2017-11-16 PROCEDURE — G9169 MEMORY GOAL STATUS: HCPCS | Performed by: SPEECH-LANGUAGE PATHOLOGIST

## 2017-11-16 PROCEDURE — 96125 COGNITIVE TEST BY HC PRO: CPT | Performed by: SPEECH-LANGUAGE PATHOLOGIST

## 2017-11-16 PROCEDURE — 93923 UPR/LXTR ART STDY 3+ LVLS: CPT

## 2017-11-16 PROCEDURE — G9168 MEMORY CURRENT STATUS: HCPCS | Performed by: SPEECH-LANGUAGE PATHOLOGIST

## 2017-11-16 PROCEDURE — G9170 MEMORY D/C STATUS: HCPCS | Performed by: SPEECH-LANGUAGE PATHOLOGIST

## 2017-11-16 NOTE — THERAPY DISCHARGE NOTE
Outpatient Speech Language Pathology   Adult Speech Language Cognitive Eval/Discharge  [facility]     Patient Name: Smith Montalvo  : 1960  MRN: [id number]  Today's Date: 2017         Visit Date: 2017    Patient Active Problem List   Diagnosis   • Generalized seizure disorder   • JOVAN (obstructive sleep apnea)   • Dyslipidemia   • Chest pain   • Orthostatic hypotension   • Tobacco abuse   • Pain in both lower extremities, exertional        Past Medical History:   Diagnosis Date   • Arthritis    • Chronic back pain     [facility]   • Coronary artery disease    • Depression    • GERD (gastroesophageal reflux disease)    • Hyperlipidemia    • Hypertension    • Neuropathy    • Orthostatic hypotension    • JOVAN (obstructive sleep apnea)    • Seizures    • Sleep apnea     only uses nasal cannula at night-2-2.5L.        Past Surgical History:   Procedure Laterality Date   • AMPUTATION FINGER / THUMB Left    • CARDIAC CATHETERIZATION     • CARDIAC CATHETERIZATION N/A 10/4/2016    Procedure: Left Heart Cath;  Surgeon: Jair Henry MD;  Location: [facility] INVASIVE LOCATION;  Service:    • CORONARY ANGIOPLASTY WITH STENT PLACEMENT     • EYE SURGERY Left    • HERNIA REPAIR     • SHOULDER SURGERY Left          Visit Dx:    ICD-10-CM ICD-9-CM   1. Memory change R41.3 780.93     Patient was seen today for memory evaluation. Patient history is remarkable for seizures, sleep apnea, and depression. Patient complains of forgetfulness. He stated that he looses things, forgets where he puts things, gets lost driving, forgets what he was doing, does not complete tasks and has difficulty remembering to take his medications. His wife reminds him of appointments and helps him to take his medications. Patient was given the RBANS today. Immediate memory, delayed memory and total scale score were all within normal limits. Attention was borderline. Internal and external strategies for memory  skills were discussed with the patient. No direct outpatient therapy warranted at this time.  See scores below.     RBANS: The Repeatable Battery for the Assessment of Neuropsychological Status (RBANS) assesses patient function in the areas of Immediate and Delayed memory, visuospatial/constructional skills, language and attention. It is used to detect and track neurocognitive deficits.   Index score Percentile Qualitative Description   Immediate Memory 81 10 Low Average   Visuospatial 92 30 Average   Language 90 25 Average   Attention 72 3 Borderline   Delayed Memory 86 18 Low Average   Total Scale 80 9 Low Average             Adult Speech Language - 11/16/17 1200     Background and History    Reason for Referral Memory Evaluation  -KG    Stated Goals To find out what's going on  -KG    Description of Complaint Patient complains of being forgetful, forgetting to do/complete tasks, loosing things, forgetting what he is looking for, and remembering appointments.   -KG    Previous Functional Status Functional for Activities of Daily Living without assistance  -KG    Pertinent Medications See chart  -KG    Primary Language in the Home English  -KG    Primary Caregiver Spouse  -KG    Informant for the Evaluation Self  -KG    Expression    Primary Mode of Expression verbal  -KG    Dominant Hand Right  -KG    Standardized Tests    Cognitive/Memory Tests RBANS: Repeatable Battery for the Assessment of Neuropsychological Status  -KG    RBANS- Repeatable Battery for the Assessment of Neuropsychological Status    Immediate Memory Index Score 81  -KG    Immediate Memory Percentile 10 %  -KG    Immediate Memory Qualitative Description low average  -KG    Visuospatial Index Score 92  -KG    Visuospatial Percentile 30 %  -KG    Visuospatial Qualitative Description average  -KG    Language Index Score 90  -KG    Language Percentile 25 %  -KG    Language Qualitative Description average  -KG    Attention Index Score 72  -KG     Attention Percentile 3 %  -KG    Attention Qualitative Description borderline  -KG    Delayed Memory Index Score 86  -KG    Delayed Memory Percentile 18 %  -KG    Delayed Memory Qualitative Description low average  -KG    Total Index Score 421  -KG    Total Percentile --   Total Scale 80, percentile rank 9  -KG    Total Qualitative Description low average  -KG      User Key  (r) = Recorded By, (t) = Taken By, (c) = Cosigned By    Initials Name Provider Type    KG Rebecca Canchola CCC-SLP Speech and Language Pathologist                              OP SLP Education       11/16/17 1304    Education    Barriers to Learning No barriers identified  -KG    Education Provided Described results of evaluation;Patient expressed understanding of evaluation  -KG    Assessed Learning needs;Learning motivation;Learning preferences;Learning readiness  -KG    Learning Motivation Moderate  -KG    Learning Method Explanation  -KG    Teaching Response Verbalized understanding  -KG      User Key  (r) = Recorded By, (t) = Taken By, (c) = Cosigned By    Initials Name Effective Dates    KG YOLANDA Lucero 08/02/16 -                     OP SLP Assessment/Plan - 11/16/17 1303     SLP Assessment    Functional Problems Speech Language- Adult/Cognition  -KG    Impact on Function: Adult Speech Language/Cognition Trouble learning or remembering new information;Poor attention to task  -KG    Clinical Impression: Speech Language-Adult/Congnition Cognitive Communication WFL  -KG    Clinical Impression Comments RBANS score within functional limits. Attention was borderline.   -KG    SLP Diagnosis Memory Change  -KG    Prognosis Good (comment)  -KG    Patient would benefit from skilled therapy intervention No  -KG    SLP Plan    Plan Comments Follow up with MD. No therapy warranted at this time.   -KG      User Key  (r) = Recorded By, (t) = Taken By, (c) = Cosigned By    Initials Name Provider Type    AMOR SanchezSLP Speech and  Language Pathologist             SLP Outcome Measures (last 72 hours)      SLP Outcome Measures       11/16/17 1300          SLP Outcome Measures    Outcome Measure Used? Adult NOMS  -KG      FCM Scores    FCM Chosen Memory  -KG      Memory FCM Score 6  -KG        User Key  (r) = Recorded By, (t) = Taken By, (c) = Cosigned By    Initials Name Effective Dates    KG YOLANDA Lucero 08/02/16 -              Time Calculation:   SLP Start Time: 0930  SLP Stop Time: 1055  SLP Time Calculation (min): 85 min    Therapy Charges for Today     Code Description Service Date Service Provider Modifiers Qty    62911727106 HC ST MEMORY CURRENT 11/16/2017 YOLANDA Lucero GN, CI 1    52276084315 HC ST MEMORY PROJECTED 11/16/2017 YOLANDA Lucero GN, CI 1    45845106798 HC ST MEMORY DISCHARGE 11/16/2017 YOLANDA Lucero GN, CI 1    57050501053 HC ST STD COG PERF TEST PER HOUR 11/16/2017 YOLANDA Lucero GN 1          SLP G-Codes  SLP NOMS Used?: Yes  Functional Limitations: Memory  Memory Current Status (): At least 1 percent but less than 20 percent impaired, limited or restricted  Memory Goal Status (): At least 1 percent but less than 20 percent impaired, limited or restricted  Memory Discharge Status (): At least 1 percent but less than 20 percent impaired, limited or restricted    OP SLP Discharge Summary  Date of Discharge: 11/16/17  Reason for Discharge: At baseline function, Per MD order  Outcomes Achieved: Other (Evaluation complete)  Discharge Destination: Home without follow-up  Discharge Instructions: Follow up with MD.     Thank you for this referral.   YOLANDA Riley  11/16/2017

## 2017-11-28 RX ORDER — LEVETIRACETAM 1000 MG/1
TABLET ORAL
Qty: 60 TABLET | Refills: 5 | Status: SHIPPED | OUTPATIENT
Start: 2017-11-28 | End: 2018-06-22 | Stop reason: SDUPTHER

## 2018-01-23 RX ORDER — ATENOLOL 25 MG/1
TABLET ORAL
Qty: 30 TABLET | Refills: 0 | OUTPATIENT
Start: 2018-01-23

## 2018-06-22 RX ORDER — LEVETIRACETAM 1000 MG/1
TABLET ORAL
Qty: 60 TABLET | Refills: 1 | Status: SHIPPED | OUTPATIENT
Start: 2018-06-22 | End: 2018-08-20 | Stop reason: SDUPTHER

## 2018-08-20 RX ORDER — LEVETIRACETAM 1000 MG/1
TABLET ORAL
Qty: 60 TABLET | Refills: 1 | Status: SHIPPED | OUTPATIENT
Start: 2018-08-20 | End: 2018-10-30 | Stop reason: SDUPTHER

## 2018-09-06 ENCOUNTER — TELEPHONE (OUTPATIENT)
Dept: NEUROLOGY | Facility: CLINIC | Age: 58
End: 2018-09-06

## 2018-10-30 RX ORDER — LEVETIRACETAM 1000 MG/1
TABLET ORAL
Qty: 60 TABLET | Refills: 1 | Status: SHIPPED | OUTPATIENT
Start: 2018-10-30 | End: 2018-12-28 | Stop reason: SDUPTHER

## 2018-10-30 NOTE — TELEPHONE ENCOUNTER
I talked to Zakiya and told her he had cancelled 2 appointments and missed 2 appointments.  She is going to schedule an appointment for him.

## 2018-11-01 RX ORDER — LEVETIRACETAM 1000 MG/1
TABLET ORAL
Qty: 180 TABLET | Refills: 0 | OUTPATIENT
Start: 2018-11-01

## 2018-12-28 RX ORDER — LEVETIRACETAM 1000 MG/1
TABLET ORAL
Qty: 60 TABLET | Refills: 0 | Status: SHIPPED | OUTPATIENT
Start: 2018-12-28 | End: 2019-04-08 | Stop reason: SDUPTHER

## 2019-01-08 ENCOUNTER — APPOINTMENT (OUTPATIENT)
Dept: CT IMAGING | Facility: HOSPITAL | Age: 59
End: 2019-01-08

## 2019-01-08 ENCOUNTER — APPOINTMENT (OUTPATIENT)
Dept: GENERAL RADIOLOGY | Facility: HOSPITAL | Age: 59
End: 2019-01-08

## 2019-01-08 ENCOUNTER — HOSPITAL ENCOUNTER (EMERGENCY)
Facility: HOSPITAL | Age: 59
Discharge: HOME | End: 2019-01-08
Attending: EMERGENCY MEDICINE | Admitting: EMERGENCY MEDICINE

## 2019-01-08 VITALS
HEART RATE: 64 BPM | OXYGEN SATURATION: 97 % | BODY MASS INDEX: 24.44 KG/M2 | DIASTOLIC BLOOD PRESSURE: 86 MMHG | SYSTOLIC BLOOD PRESSURE: 135 MMHG | TEMPERATURE: 97.7 F | WEIGHT: 165 LBS | RESPIRATION RATE: 18 BRPM | HEIGHT: 69 IN

## 2019-01-08 DIAGNOSIS — R07.9 CHEST PAIN, UNSPECIFIED TYPE: Primary | ICD-10-CM

## 2019-01-08 LAB
ANION GAP SERPL CALCULATED.3IONS-SCNC: 9 MMOL/L (ref 4–13)
APTT PPP: 37.1 SECONDS (ref 24.1–34.8)
BASOPHILS # BLD AUTO: 0.05 10*3/MM3 (ref 0–0.2)
BASOPHILS NFR BLD AUTO: 0.5 % (ref 0–2)
BUN BLD-MCNC: 9 MG/DL (ref 5–21)
BUN/CREAT SERPL: 13.4 (ref 7–25)
CALCIUM SPEC-SCNC: 8.9 MG/DL (ref 8.4–10.4)
CHLORIDE SERPL-SCNC: 100 MMOL/L (ref 98–110)
CO2 SERPL-SCNC: 32 MMOL/L (ref 24–31)
CREAT BLD-MCNC: 0.67 MG/DL (ref 0.5–1.4)
D DIMER PPP FEU-MCNC: 0.61 MG/L (FEU) (ref 0–0.5)
DEPRECATED RDW RBC AUTO: 43 FL (ref 40–54)
EOSINOPHIL # BLD AUTO: 0.22 10*3/MM3 (ref 0–0.7)
EOSINOPHIL NFR BLD AUTO: 2.3 % (ref 0–4)
ERYTHROCYTE [DISTWIDTH] IN BLOOD BY AUTOMATED COUNT: 13 % (ref 12–15)
GFR SERPL CREATININE-BSD FRML MDRD: 121 ML/MIN/1.73
GLUCOSE BLD-MCNC: 100 MG/DL (ref 70–100)
HCT VFR BLD AUTO: 40 % (ref 40–52)
HGB BLD-MCNC: 13.1 G/DL (ref 14–18)
IMM GRANULOCYTES # BLD AUTO: 0.02 10*3/MM3 (ref 0–0.03)
IMM GRANULOCYTES NFR BLD AUTO: 0.2 % (ref 0–5)
INR PPP: 0.93 (ref 0.91–1.09)
LYMPHOCYTES # BLD AUTO: 3 10*3/MM3 (ref 0.72–4.86)
LYMPHOCYTES NFR BLD AUTO: 31.8 % (ref 15–45)
MCH RBC QN AUTO: 29.6 PG (ref 28–32)
MCHC RBC AUTO-ENTMCNC: 32.8 G/DL (ref 33–36)
MCV RBC AUTO: 90.3 FL (ref 82–95)
MONOCYTES # BLD AUTO: 0.53 10*3/MM3 (ref 0.19–1.3)
MONOCYTES NFR BLD AUTO: 5.6 % (ref 4–12)
NEUTROPHILS # BLD AUTO: 5.6 10*3/MM3 (ref 1.87–8.4)
NEUTROPHILS NFR BLD AUTO: 59.6 % (ref 39–78)
NRBC BLD AUTO-RTO: 0 /100 WBC (ref 0–0)
PLATELET # BLD AUTO: 335 10*3/MM3 (ref 130–400)
PMV BLD AUTO: 9.1 FL (ref 6–12)
POTASSIUM BLD-SCNC: 4.3 MMOL/L (ref 3.5–5.3)
PROTHROMBIN TIME: 12.7 SECONDS (ref 11.9–14.6)
RBC # BLD AUTO: 4.43 10*6/MM3 (ref 4.8–5.9)
SODIUM BLD-SCNC: 141 MMOL/L (ref 135–145)
TROPONIN I SERPL-MCNC: <0.012 NG/ML (ref 0–0.03)
TROPONIN I SERPL-MCNC: <0.012 NG/ML (ref 0–0.03)
WBC NRBC COR # BLD: 9.42 10*3/MM3 (ref 4.8–10.8)

## 2019-01-08 PROCEDURE — 85025 COMPLETE CBC W/AUTO DIFF WBC: CPT | Performed by: EMERGENCY MEDICINE

## 2019-01-08 PROCEDURE — 80048 BASIC METABOLIC PNL TOTAL CA: CPT | Performed by: EMERGENCY MEDICINE

## 2019-01-08 PROCEDURE — 0 IOPAMIDOL PER 1 ML: Performed by: EMERGENCY MEDICINE

## 2019-01-08 PROCEDURE — 85379 FIBRIN DEGRADATION QUANT: CPT | Performed by: EMERGENCY MEDICINE

## 2019-01-08 PROCEDURE — 85610 PROTHROMBIN TIME: CPT | Performed by: EMERGENCY MEDICINE

## 2019-01-08 PROCEDURE — 93010 ELECTROCARDIOGRAM REPORT: CPT | Performed by: INTERNAL MEDICINE

## 2019-01-08 PROCEDURE — 99284 EMERGENCY DEPT VISIT MOD MDM: CPT

## 2019-01-08 PROCEDURE — 25010000002 MORPHINE PER 10 MG: Performed by: EMERGENCY MEDICINE

## 2019-01-08 PROCEDURE — 84484 ASSAY OF TROPONIN QUANT: CPT | Performed by: EMERGENCY MEDICINE

## 2019-01-08 PROCEDURE — 71046 X-RAY EXAM CHEST 2 VIEWS: CPT

## 2019-01-08 PROCEDURE — 93005 ELECTROCARDIOGRAM TRACING: CPT | Performed by: EMERGENCY MEDICINE

## 2019-01-08 PROCEDURE — 96374 THER/PROPH/DIAG INJ IV PUSH: CPT

## 2019-01-08 PROCEDURE — 85730 THROMBOPLASTIN TIME PARTIAL: CPT | Performed by: EMERGENCY MEDICINE

## 2019-01-08 PROCEDURE — 71275 CT ANGIOGRAPHY CHEST: CPT

## 2019-01-08 RX ADMIN — MORPHINE SULFATE 4 MG: 4 INJECTION, SOLUTION INTRAMUSCULAR; INTRAVENOUS at 13:54

## 2019-01-08 RX ADMIN — IOPAMIDOL 77 ML: 755 INJECTION, SOLUTION INTRAVENOUS at 16:05

## 2019-01-08 NOTE — DISCHARGE INSTRUCTIONS
Please read and follow all directions.  Tylenol or ibuprofen for discomfort as needed.  Start Voltaren gel and take as directed.  Take all home medications as previously prescribed.  Please contact your primary care provider and Dr. Henry tomorrow to arrange for outpatient follow-up.  Return to the emergency department sooner if symptoms worsen or for any additional concerns.

## 2019-01-08 NOTE — ED PROVIDER NOTES
"Subjective   This is a 58-year-old male who has been transferred to this emergency department from [place] for evaluation of chest pain.  Patient describes intermittent chest pain for approximately 3 or 4 weeks.  No fall or injury.  No rash or overlying skin change.  Chest pain is reproducible with palpation of the chest.  Pain is described as \"stabbing\".  No specific precipitating, aggravating, or relieving factors.  Patient has a known history of coronary artery disease with a prior heart cath and stent placement by Dr. Henry in January 2016.  Repeat cath in October performed with no intervention.  Patient describes that sometimes when the pain is severe it makes him feel short of breath.  Has had a recent non-productive cough.  No wheezing.  Earlier today he had some associated palpitations and dizziness.  He went to his primary care provider's office and was then referred to the ER there.  Patient was initially seen at [facility] with negative EKG, labs, and imaging.  The ER attending at that facility, Dr. Ling, called me for transfer.  He made it clear that he had no intention of holding the patient for a repeat set of cardiac biomarkers or contacting the patient's cardiologist to assist with disposition.  He was given nitroglycerin, aspirin, and narcotics with minimal change in discomfort prior to transfer.  Patient denies any recent fever or shaking chills.  No abdominal pain.  No nausea or vomiting.  No stool or urine changes.  No lower extremity swelling or calf pain.  Denies a known history of PE/DVT.  Review of systems otherwise negative.  Patient expresses no additional concerns at this time.            Review of Systems   All other systems reviewed and are negative.      Past Medical History:   Diagnosis Date   • Arthritis    • Chronic back pain     [facility]   • Coronary artery disease    • Depression    • Epilepsia (CMS/[facility])    • GERD (gastroesophageal reflux " disease)    • Hyperlipidemia    • Hypertension    • Neuropathy    • Orthostatic hypotension    • JOVAN (obstructive sleep apnea)    • Seizures (CMS/[facility])    • Sleep apnea     only uses nasal cannula at night-2-2.5L.       No Known Allergies    Past Surgical History:   Procedure Laterality Date   • AMPUTATION FINGER / THUMB Left    • CARDIAC CATHETERIZATION     • CARDIAC CATHETERIZATION N/A 10/4/2016    Procedure: Left Heart Cath;  Surgeon: Jair Henry MD;  Location:  [facility];  Service:    • CORONARY ANGIOPLASTY WITH STENT PLACEMENT     • EYE SURGERY Left    • HERNIA REPAIR     • SHOULDER SURGERY Left        Family History   Problem Relation Age of Onset   • No Known Problems Mother    • No Known Problems Father        Social History     Socioeconomic History   • Marital status:      Spouse name: Not on file   • Number of children: Not on file   • Years of education: Not on file   • Highest education level: Not on file   Tobacco Use   • Smoking status: Current Every Day Smoker     Packs/day: 0.25     Years: 32.00     Pack years: 8.00     Types: Cigarettes   • Smokeless tobacco: Never Used   Substance and Sexual Activity   • Alcohol use: No   • Drug use: No   • Sexual activity: Defer           Objective   Physical Exam   Constitutional: He is oriented to person, place, and time. He appears well-developed and well-nourished.   HENT:   Head: Normocephalic and atraumatic.   Eyes: EOM are normal. Pupils are equal, round, and reactive to light.   Neck: Normal range of motion. Neck supple. No JVD present. No tracheal deviation present.   Cardiovascular: Normal rate, regular rhythm and normal heart sounds.   Pulmonary/Chest: Effort normal and breath sounds normal. No respiratory distress. He has no wheezes. He has no rales. He exhibits no tenderness.   Abdominal: Soft. Bowel sounds are normal. There is no tenderness. There is no guarding.   Musculoskeletal: He exhibits no edema or deformity.         Right lower leg: He exhibits no tenderness and no edema.        Left lower leg: He exhibits no tenderness and no edema.   Palpation of the left lower chest wall just lateral to the sternum exactly reproduces discomfort.   Neurological: He is alert and oriented to person, place, and time.   Skin: Skin is warm and dry. Capillary refill takes less than 2 seconds.   Psychiatric: He has a normal mood and affect. His behavior is normal.   Nursing note and vitals reviewed.      Procedures           ED Course  ED Course as of Jan 08 1828 Tue Jan 08, 2019   1328 D-dimer, Quant: (!) 0.61 [MW]   1328 Troponin I: <0.012 [MW]      ED Course User Index  [MW] Suleman Herrera DO      EKG at 1224 shows a sinus rhythm with a rate of 73 bpm.  Intervals within normal limits.  Normal axis.  R-wave progression is maintained.  T wave inversions in lead 3.  No ST elevation or evidence for acute ischemia/infarction.    Prior notes from Dr. Henry and cath reports reviewed    Troponin negative ×2  Repeat EKG showing no interval change or infarction    CT Angiogram Chest With Contrast   Final Result       1. No pulmonary embolus.   2. Patchy atelectasis bilaterally.       This report was finalized on 01/08/2019 16:28 by Dr Oscar Alarcon, .      XR Chest 2 View   Final Result   1. No radiographic evidence of acute cardiopulmonary process.           This report was finalized on 01/08/2019 13:17 by Dr. Larry Fish MD.        I spoke with Dr. Thurston who has offered admission if patient requests  Patient updated  He has no new symptoms   Stable vital signs  Patient is requesting discharge home                MDM  No clinical suspicion for an acute coronary syndrome given the time of onset, reproducibility, and negative troponins and EKG ×3 today  A CT scan was ordered because of the elevated d-dimer but is negative for acute process  I spoke with Dr. Thurston who indicates that the patient can safely be discharged home but will admit if patient  requests    Patient updated on all results and my discussion with Dr. Thurston  He was offered an observation admission but has declined   Patient is requesting discharge home  Dr. Thurston had indicated that if he was to be discharged a prescription for Voltaren gel would be appropriate  [facility] report #[id number] reviewed  Patient does have an active prescription for Percocet     Have encouraged outpatient follow-up or return if symptoms worsen        Final diagnoses:   Chest pain, unspecified type            Suleman Herrera, DO  01/08/19 [id number]

## 2019-01-16 ENCOUNTER — NURSE TRIAGE (OUTPATIENT)
Dept: CALL CENTER | Facility: HOSPITAL | Age: 59
End: 2019-01-16

## 2019-01-16 NOTE — TELEPHONE ENCOUNTER
" Caller is calling because the medication prescribed for her   Diclofenac Sodium 4 g Topical 3 Times Daily, needs a prior authorization, was written by an ER physician, who the caller stated if she had any problems, he would help her.  Spoke with  and ED CN. Informed the caller she would have to call her provider. Caller stated she has tried, but provider wanted another office visit, caller feels she should not have to pay  For another visit, since her  was in the office at the time of the visit to ED. Suggested to go to urgent. Did make a total of 3 phone calls attempting to help the caller. She stated\" you do not care and are not going to help me.\"    Reason for Disposition  • Pharmacy calling with prescription questions and triager unable to answer question    Additional Information  • Negative: Drug overdose and nurse unable to answer question  • Negative: Caller requesting information not related to medicine  • Negative: Caller requesting a prescription for Strep throat and has a positive culture result  • Negative: Rash while taking a medication or within 3 days of stopping it  • Negative: Immunization reaction suspected  • Negative: [1] Asthma and [2] having symptoms of asthma (cough, wheezing, etc)  • Negative: MORE THAN A DOUBLE DOSE of a prescription or over-the-counter (OTC) drug  • Negative: [1] DOUBLE DOSE (an extra dose or lesser amount) of over-the-counter (OTC) drug AND [2] any symptoms (e.g., dizziness, nausea, pain, sleepiness)  • Negative: [1] DOUBLE DOSE (an extra dose or lesser amount) of prescription drug AND [2] any symptoms (e.g., dizziness, nausea, pain, sleepiness)  • Negative: Took another person's prescription drug  • Negative: [1] DOUBLE DOSE (an extra dose or lesser amount) of prescription drug AND [2] NO symptoms (Exception: a double dose of antibiotics)  • Negative: Diabetes drug error or overdose (e.g., insulin or extra dose)  • Negative: [1] Request for " "URGENT new prescription or refill of \"essential\" medication (i.e., likelihood of harm to patient if not taken) AND [2] triager unable to fill per unit policy  • Negative: [1] Prescription not at pharmacy AND [2] was prescribed today by PCP    Answer Assessment - Initial Assessment Questions  1. SYMPTOMS: \"Do you have any symptoms?\"      Chest pain  2. SEVERITY: If symptoms are present, ask \"Are they mild, moderate or severe?\"      mild    Protocols used: MEDICATION QUESTION CALL-ADULT-    "

## 2019-04-08 ENCOUNTER — LAB (OUTPATIENT)
Dept: LAB | Facility: HOSPITAL | Age: 59
End: 2019-04-08

## 2019-04-08 ENCOUNTER — OFFICE VISIT (OUTPATIENT)
Dept: NEUROLOGY | Facility: CLINIC | Age: 59
End: 2019-04-08

## 2019-04-08 VITALS
SYSTOLIC BLOOD PRESSURE: 132 MMHG | HEART RATE: 72 BPM | BODY MASS INDEX: 23.7 KG/M2 | WEIGHT: 160 LBS | HEIGHT: 69 IN | DIASTOLIC BLOOD PRESSURE: 70 MMHG

## 2019-04-08 DIAGNOSIS — Z51.81 THERAPEUTIC DRUG MONITORING: ICD-10-CM

## 2019-04-08 DIAGNOSIS — M79.604 PAIN IN BOTH LOWER EXTREMITIES: ICD-10-CM

## 2019-04-08 DIAGNOSIS — G40.309 GENERALIZED SEIZURE DISORDER (HCC): Primary | ICD-10-CM

## 2019-04-08 DIAGNOSIS — M79.605 PAIN IN BOTH LOWER EXTREMITIES: ICD-10-CM

## 2019-04-08 DIAGNOSIS — G47.33 OSA (OBSTRUCTIVE SLEEP APNEA): ICD-10-CM

## 2019-04-08 LAB
ALBUMIN SERPL-MCNC: 4.7 G/DL (ref 3.5–5)
ALBUMIN/GLOB SERPL: 1.5 G/DL (ref 1.1–2.5)
ALP SERPL-CCNC: 96 U/L (ref 24–120)
ALT SERPL W P-5'-P-CCNC: 18 U/L (ref 0–54)
ANION GAP SERPL CALCULATED.3IONS-SCNC: 11 MMOL/L (ref 4–13)
AST SERPL-CCNC: 26 U/L (ref 7–45)
BILIRUB SERPL-MCNC: 0.5 MG/DL (ref 0.1–1)
BUN BLD-MCNC: 25 MG/DL (ref 5–21)
BUN/CREAT SERPL: 29.4 (ref 7–25)
CALCIUM SPEC-SCNC: 9.4 MG/DL (ref 8.4–10.4)
CHLORIDE SERPL-SCNC: 99 MMOL/L (ref 98–110)
CLUMPED PLATELETS: PRESENT
CO2 SERPL-SCNC: 30 MMOL/L (ref 24–31)
CREAT BLD-MCNC: 0.85 MG/DL (ref 0.5–1.4)
DEPRECATED RDW RBC AUTO: 44.6 FL (ref 40–54)
EOSINOPHIL # BLD MANUAL: 0.59 10*3/MM3 (ref 0–0.7)
EOSINOPHIL NFR BLD MANUAL: 4 % (ref 0–4)
ERYTHROCYTE [DISTWIDTH] IN BLOOD BY AUTOMATED COUNT: 13.8 % (ref 12–15)
GFR SERPL CREATININE-BSD FRML MDRD: 92 ML/MIN/1.73
GLOBULIN UR ELPH-MCNC: 3.1 GM/DL
GLUCOSE BLD-MCNC: 78 MG/DL (ref 70–100)
HCT VFR BLD AUTO: 42.3 % (ref 40–52)
HGB BLD-MCNC: 13.9 G/DL (ref 14–18)
LYMPHOCYTES # BLD MANUAL: 2.34 10*3/MM3 (ref 0.72–4.86)
LYMPHOCYTES NFR BLD MANUAL: 15.8 % (ref 15–45)
LYMPHOCYTES NFR BLD MANUAL: 5.9 % (ref 4–12)
MCH RBC QN AUTO: 28.9 PG (ref 28–32)
MCHC RBC AUTO-ENTMCNC: 32.9 G/DL (ref 33–36)
MCV RBC AUTO: 87.9 FL (ref 82–95)
MONOCYTES # BLD AUTO: 0.87 10*3/MM3 (ref 0.19–1.3)
NEUTROPHILS # BLD AUTO: 9.08 10*3/MM3 (ref 1.87–8.4)
NEUTROPHILS NFR BLD MANUAL: 61.4 % (ref 39–78)
PLATELET # BLD AUTO: 342 10*3/MM3 (ref 130–400)
PMV BLD AUTO: 9.5 FL (ref 6–12)
POIKILOCYTOSIS BLD QL SMEAR: ABNORMAL
POTASSIUM BLD-SCNC: 3.6 MMOL/L (ref 3.5–5.3)
PROT SERPL-MCNC: 7.8 G/DL (ref 6.3–8.7)
RBC # BLD AUTO: 4.81 10*6/MM3 (ref 4.8–5.9)
SODIUM BLD-SCNC: 140 MMOL/L (ref 135–145)
TARGETS BLD QL SMEAR: ABNORMAL
VARIANT LYMPHS NFR BLD MANUAL: 12.9 % (ref 0–5)
WBC MORPH BLD: NORMAL
WBC NRBC COR # BLD: 14.79 10*3/MM3 (ref 4.8–10.8)

## 2019-04-08 PROCEDURE — 99406 BEHAV CHNG SMOKING 3-10 MIN: CPT | Performed by: CLINICAL NURSE SPECIALIST

## 2019-04-08 PROCEDURE — 80177 DRUG SCRN QUAN LEVETIRACETAM: CPT | Performed by: CLINICAL NURSE SPECIALIST

## 2019-04-08 PROCEDURE — 80053 COMPREHEN METABOLIC PANEL: CPT | Performed by: CLINICAL NURSE SPECIALIST

## 2019-04-08 PROCEDURE — 36415 COLL VENOUS BLD VENIPUNCTURE: CPT

## 2019-04-08 PROCEDURE — 85025 COMPLETE CBC W/AUTO DIFF WBC: CPT | Performed by: CLINICAL NURSE SPECIALIST

## 2019-04-08 PROCEDURE — 85007 BL SMEAR W/DIFF WBC COUNT: CPT | Performed by: CLINICAL NURSE SPECIALIST

## 2019-04-08 PROCEDURE — 99214 OFFICE O/P EST MOD 30 MIN: CPT | Performed by: CLINICAL NURSE SPECIALIST

## 2019-04-08 RX ORDER — LEVETIRACETAM 1000 MG/1
1000 TABLET ORAL 2 TIMES DAILY
Qty: 60 TABLET | Refills: 5 | Status: SHIPPED | OUTPATIENT
Start: 2019-04-08 | End: 2019-10-01 | Stop reason: SDUPTHER

## 2019-04-08 NOTE — PATIENT INSTRUCTIONS
Steps to Quit Smoking  Smoking tobacco can be bad for your health. It can also affect almost every organ in your body. Smoking puts you and people around you at risk for many serious long-lasting (chronic) diseases. Quitting smoking is hard, but it is one of the best things that you can do for your health. It is never too late to quit.  What are the benefits of quitting smoking?  When you quit smoking, you lower your risk for getting serious diseases and conditions. They can include:  · Lung cancer or lung disease.  · Heart disease.  · Stroke.  · Heart attack.  · Not being able to have children (infertility).  · Weak bones (osteoporosis) and broken bones (fractures).    If you have coughing, wheezing, and shortness of breath, those symptoms may get better when you quit. You may also get sick less often. If you are pregnant, quitting smoking can help to lower your chances of having a baby of low birth weight.  What can I do to help me quit smoking?  Talk with your doctor about what can help you quit smoking. Some things you can do (strategies) include:  · Quitting smoking totally, instead of slowly cutting back how much you smoke over a period of time.  · Going to in-person counseling. You are more likely to quit if you go to many counseling sessions.  · Using resources and support systems, such as:  ? Online chats with a counselor.  ? Phone quitlines.  ? Printed self-help materials.  ? Support groups or group counseling.  ? Text messaging programs.  ? Mobile phone apps or applications.  · Taking medicines. Some of these medicines may have nicotine in them. If you are pregnant or breastfeeding, do not take any medicines to quit smoking unless your doctor says it is okay. Talk with your doctor about counseling or other things that can help you.    Talk with your doctor about using more than one strategy at the same time, such as taking medicines while you are also going to in-person counseling. This can help make  quitting easier.  What things can I do to make it easier to quit?  Quitting smoking might feel very hard at first, but there is a lot that you can do to make it easier. Take these steps:  · Talk to your family and friends. Ask them to support and encourage you.  · Call phone quitlines, reach out to support groups, or work with a counselor.  · Ask people who smoke to not smoke around you.  · Avoid places that make you want (trigger) to smoke, such as:  ? Bars.  ? Parties.  ? Smoke-break areas at work.  · Spend time with people who do not smoke.  · Lower the stress in your life. Stress can make you want to smoke. Try these things to help your stress:  ? Getting regular exercise.  ? Deep-breathing exercises.  ? Yoga.  ? Meditating.  ? Doing a body scan. To do this, close your eyes, focus on one area of your body at a time from head to toe, and notice which parts of your body are tense. Try to relax the muscles in those areas.  · Download or buy apps on your mobile phone or tablet that can help you stick to your quit plan. There are many free apps, such as QuitGuide from the CDC (Centers for Disease Control and Prevention). You can find more support from smokefree.gov and other websites.    This information is not intended to replace advice given to you by your health care provider. Make sure you discuss any questions you have with your health care provider.  Document Released: 10/14/2010 Document Revised: 08/15/2017 Document Reviewed: 05/03/2016  [employer] Interactive Patient Education © 2019 [employer]  BMI for Adults  Body mass index (BMI) is a number that is calculated from a person's weight and height. In most adults, the number is used to find how much of an adult's weight is made up of fat. BMI is not as accurate as a direct measure of body fat.  How is BMI calculated?  BMI is calculated by dividing weight in kilograms by height in meters squared. It can also be calculated by dividing weight in pounds by height  in inches squared, then multiplying the resulting number by 703. Charts are available to help you find your BMI quickly and easily without doing this calculation.  How is BMI interpreted?  Health care professionals use BMI charts to identify whether an adult is underweight, at a normal weight, or overweight based on the following guidelines:  · Underweight: BMI less than 18.5.  · Normal weight: BMI between 18.5 and 24.9.  · Overweight: BMI between 25 and 29.9.  · Obese: BMI of 30 and above.    BMI is usually interpreted the same for males and females.  Weight includes both fat and muscle, so someone with a muscular build, such as an athlete, may have a BMI that is higher than 24.9. In cases like these, BMI may not accurately depict body fat. To determine if excess body fat is the cause of a BMI of 25 or higher, further assessments may need to be done by a health care provider.  Why is BMI a useful tool?  BMI is used to identify a possible weight problem that may be related to a medical problem or may increase the risk for medical problems. BMI can also be used to promote changes to reach a healthy weight.  This information is not intended to replace advice given to you by your health care provider. Make sure you discuss any questions you have with your health care provider.  Document Released: 08/29/2005 Document Revised: 04/27/2017 Document Reviewed: 05/15/2015  [employer] Interactive Patient Education © 2018 [employer]

## 2019-04-08 NOTE — PROGRESS NOTES
Subjective     Chief Complaint   Patient presents with   • Seizures     Last sz Feb this year       Smith Montalvo is a 59 y.o. male right handed trying to get disability. He is here today for follow up for seizures. He is accompanied by his wife.Both are poor historians. He was last seen 11/2017.  At that time was complaining of exertional pain in his calves. He did have LE arterial studies that were negative for stenosis. He was also having complaints of impaired memory. He did see ST 11/2107 and that showed average to low average cognition. Cognitive skills were discussed with patient. He was to have MRI brain at [facility] but did not keep his appointment. Prior MMSE 30/30.   He continues to use tobacco 2-3 cigarettes daily.       Patient states he did have a seizure he thinks in February 2019. But cannot tell me any details of the event.  He states he is taking Keppra 1000 mg BID. Patient states uses [facility]. my office did call [email or web address] and patient has not filled since 11/2018. Patient did have Keppra level 11/2017 that was WNL.   He has hx of HA that can last 1-3 days a week. Unchanged from last visit.        Seizures    This is a chronic problem. Episode onset: last reported seizure 3/2017. Associated symptoms include chest pain (heart cath a few months ago). Pertinent negatives include no confusion, no sore throat, no cough, no nausea, no vomiting and no diarrhea. Characteristics include bladder incontinence, rhythmic jerking and loss of consciousness. Characteristics do not include apnea.   Memory Loss   This is a new problem. The current episode started more than 1 month ago. The problem occurs daily. The problem has been unchanged. Associated symptoms include chest pain (heart cath a few months ago). Pertinent negatives include no arthralgias, coughing, fatigue, fever, myalgias, nausea, sore throat, vomiting or weakness. Associated symptoms comments: Difficulty remembering daily  activities from one day to the next. Exacerbated by: seizure disorder, tobacco abuse, JOVAN. He has tried nothing for the symptoms. The treatment provided no relief.        Current Outpatient Medications   Medication Sig Dispense Refill   • aspirin 81 MG EC tablet Take 81 mg by mouth daily.     • atenolol (TENORMIN) 25 MG tablet Take 0.5 tablets by mouth Daily. 30 tablet 0   • citalopram (CeleXA) 40 MG tablet Take 40 mg by mouth daily.     • cyclobenzaprine (FLEXERIL) 10 MG tablet Take 10 mg by mouth 3 (Three) Times a Day As Needed for muscle spasms.     • fexofenadine (ALLEGRA) 180 MG tablet Take 180 mg by mouth Daily.     • gabapentin (NEURONTIN) 100 MG capsule Take 100 mg by mouth 2 (two) times a day.     • levETIRAcetam (KEPPRA) 1000 MG tablet Take 1 tablet by mouth 2 (Two) Times a Day. 60 tablet 5   • midodrine (PROAMATINE) 2.5 MG tablet Take 2.5 mg by mouth 2 (Two) Times a Day.  2   • nitroglycerin (NITROSTAT) 0.4 MG SL tablet Place 0.4 mg under the tongue every 5 (five) minutes as needed for chest pain. Take no more than 3 doses in 15 minutes.     • oxyCODONE-acetaminophen (PERCOCET) 7.5-325 MG per tablet Take 1 tablet by mouth 3 (three) times a day.     • pantoprazole (PROTONIX) 40 MG EC tablet Take 40 mg by mouth daily.  5   • simvastatin (ZOCOR) 20 MG tablet Take 20 mg by mouth every night.  5   • zolpidem (AMBIEN) 5 MG tablet Take 5 mg by mouth At Night As Needed for sleep.       No current facility-administered medications for this visit.        Past Medical History:   Diagnosis Date   • Arthritis    • Chronic back pain     [facility]   • Coronary artery disease    • Depression    • Epilepsia (CMS/HCC)    • GERD (gastroesophageal reflux disease)    • Hyperlipidemia    • Hypertension    • Neuropathy    • Orthostatic hypotension    • JOVAN (obstructive sleep apnea)    • Seizures (CMS/HCC)    • Sleep apnea     only uses nasal cannula at night-2-2.5L.       Past Surgical History:    Procedure Laterality Date   • AMPUTATION FINGER / THUMB Left    • CARDIAC CATHETERIZATION     • CARDIAC CATHETERIZATION N/A 10/4/2016    Procedure: Left Heart Cath;  Surgeon: Jair Henry MD;  Location:  [facility] CATH INVASIVE LOCATION;  Service:    • CORONARY ANGIOPLASTY WITH STENT PLACEMENT     • EYE SURGERY Left    • HERNIA REPAIR     • SHOULDER SURGERY Left        family history includes No Known Problems in his father and mother.    Social History     Tobacco Use   • Smoking status: Current Every Day Smoker     Packs/day: 0.25     Years: 32.00     Pack years: 8.00     Types: Cigarettes   • Smokeless tobacco: Never Used   Substance Use Topics   • Alcohol use: No   • Drug use: No       Review of Systems   Constitutional: Negative.  Negative for fatigue and fever.   HENT: Positive for dental problem (missing several teeth). Negative for hearing loss, rhinorrhea and sore throat.    Eyes: Negative.    Respiratory: Positive for shortness of breath. Negative for apnea and cough.    Cardiovascular: Positive for chest pain (heart cath a few months ago). Negative for leg swelling.   Gastrointestinal: Negative.  Negative for constipation, diarrhea, nausea and vomiting.   Endocrine: Negative.  Negative for cold intolerance and heat intolerance.   Genitourinary: Positive for bladder incontinence. Negative for dysuria and frequency.   Musculoskeletal: Negative for arthralgias, gait problem and myalgias.   Skin: Negative.    Allergic/Immunologic: Negative.    Neurological: Positive for dizziness, seizures, loss of consciousness, syncope and light-headedness (history of orhtostatic hypotension). Negative for weakness.   Hematological: Negative.  Negative for adenopathy.   Psychiatric/Behavioral: Negative.  Negative for agitation and confusion.        Episodes of yelling out during sleep occurring 1-2 times weekly lasting about 5 minutes. No inccontinence, no tongue biting. Does not always wear oxygen, does take ambien for sleep  "  All other systems reviewed and are negative.      Objective     /70   Pulse 72   Ht 175.3 cm (69\")   Wt 72.6 kg (160 lb)   BMI 23.63 kg/m² , Body mass index is 23.63 kg/m².    Physical Exam   Constitutional: He is oriented to person, place, and time. Vital signs are normal. He appears well-developed and well-nourished.   HENT:   Head: Normocephalic and atraumatic.   Right Ear: External ear normal.   Left Ear: External ear normal.   Nose: Nose normal.   Mouth/Throat: Oropharynx is clear and moist and mucous membranes are normal. Abnormal dentition (missing front 2 bottom teeth).   Eyes: Conjunctivae, EOM and lids are normal. Pupils are equal, round, and reactive to light. Right eye exhibits normal extraocular motion and no nystagmus. Left eye exhibits normal extraocular motion and no nystagmus. Right pupil is round and reactive. Left pupil is round and reactive. Pupils are equal.   Neck: Trachea normal, normal range of motion and phonation normal. Neck supple. Carotid bruit is not present.   Cardiovascular: Normal rate, regular rhythm and normal heart sounds.   No murmur heard.  Pulses:       Dorsalis pedis pulses are 1+ on the right side, and 1+ on the left side.        Posterior tibial pulses are 2+ on the right side, and 2+ on the left side.   Pulmonary/Chest: Effort normal and breath sounds normal. He has no decreased breath sounds. He has no rhonchi.   Abdominal: Soft. Bowel sounds are normal.   Musculoskeletal: Normal range of motion.       Neurological Sensory Findings - Altered hot/cold left ankle/foot discrimination.Unaltered hot/cold right ankle/foot discrimination. Altered sharp/dull left ankle/foot discrimination. Unaltered sharp/dull right ankle/foot discrimination.  Neurological: He is alert and oriented to person, place, and time. He has normal strength and normal reflexes. Tremors: intention tremor LUE. No cranial nerve deficit or sensory deficit. He displays a negative Romberg sign. " Coordination (no ataxia, finger to nose intact) and gait normal.   Reflex Scores:       Tricep reflexes are 2+ on the right side and 2+ on the left side.       Bicep reflexes are 2+ on the right side and 2+ on the left side.       Brachioradialis reflexes are 2+ on the right side and 2+ on the left side.       Patellar reflexes are 2+ on the right side and 2+ on the left side.       Achilles reflexes are 2+ on the right side and 2+ on the left side.  Awake, alert. No aphasia, no dysarthria  Completes simple and complex commands    CN II:  Visual fields full.  Pupils equally reactive to light  CN III, IV, VI:  Extraocular Muscles full with no signs of nystagmus  CN V:  Facial sensory is symmetric with no asymetries.  CN VII:  Facial motor symmetric  CN VIII:  Gross hearing intact bilaterally  CN IX:  Palate elevates symmetrically  CN X:  Palate elevates symmetrically  CN XI:  Shoulder shrug symmetric  CN XII:  Tongue is midline on protrusion    Full and symmetric strength bilateral upper and lower extremities.   Skin: Skin is warm and dry.   Psychiatric: He has a normal mood and affect. His speech is normal and behavior is normal. Cognition and memory are normal.   Nursing note and vitals reviewed.      Results for orders placed or performed during the hospital encounter of 01/08/19   Basic Metabolic Panel   Result Value Ref Range    Glucose 100 70 - 100 mg/dL    BUN 9 5 - 21 mg/dL    Creatinine 0.67 0.50 - 1.40 mg/dL    Sodium 141 135 - 145 mmol/L    Potassium 4.3 3.5 - 5.3 mmol/L    Chloride 100 98 - 110 mmol/L    CO2 32.0 (H) 24.0 - 31.0 mmol/L    Calcium 8.9 8.4 - 10.4 mg/dL    eGFR Non African Amer 121 >60 mL/min/1.73    BUN/Creatinine Ratio 13.4 7.0 - 25.0    Anion Gap 9.0 4.0 - 13.0 mmol/L   Protime-INR   Result Value Ref Range    Protime 12.7 11.9 - 14.6 Seconds    INR 0.93 0.91 - 1.09   aPTT   Result Value Ref Range    PTT 37.1 (H) 24.1 - 34.8 seconds   Troponin   Result Value Ref Range    Troponin I <0.012  0.000 - 0.034 ng/mL   D-dimer, Quantitative   Result Value Ref Range    D-Dimer, Quantitative 0.61 (H) 0.00 - 0.50 mg/L (FEU)   CBC Auto Differential   Result Value Ref Range    WBC 9.42 4.80 - 10.80 10*3/mm3    RBC 4.43 (L) 4.80 - 5.90 10*6/mm3    Hemoglobin 13.1 (L) 14.0 - 18.0 g/dL    Hematocrit 40.0 40.0 - 52.0 %    MCV 90.3 82.0 - 95.0 fL    MCH 29.6 28.0 - 32.0 pg    MCHC 32.8 (L) 33.0 - 36.0 g/dL    RDW 13.0 12.0 - 15.0 %    RDW-SD 43.0 40.0 - 54.0 fl    MPV 9.1 6.0 - 12.0 fL    Platelets 335 130 - 400 10*3/mm3    Neutrophil % 59.6 39.0 - 78.0 %    Lymphocyte % 31.8 15.0 - 45.0 %    Monocyte % 5.6 4.0 - 12.0 %    Eosinophil % 2.3 0.0 - 4.0 %    Basophil % 0.5 0.0 - 2.0 %    Immature Grans % 0.2 0.0 - 5.0 %    Neutrophils, Absolute 5.60 1.87 - 8.40 10*3/mm3    Lymphocytes, Absolute 3.00 0.72 - 4.86 10*3/mm3    Monocytes, Absolute 0.53 0.19 - 1.30 10*3/mm3    Eosinophils, Absolute 0.22 0.00 - 0.70 10*3/mm3    Basophils, Absolute 0.05 0.00 - 0.20 10*3/mm3    Immature Grans, Absolute 0.02 0.00 - 0.03 10*3/mm3    nRBC 0.0 0.0 - 0.0 /100 WBC   Troponin   Result Value Ref Range    Troponin I <0.012 0.000 - 0.034 ng/mL      ST MEMORY EVAL:     Patient was seen today for memory evaluation. Patient history is remarkable for seizures, sleep apnea, and depression. Patient complains of forgetfulness. He stated that he looses things, forgets where he puts things, gets lost driving, forgets what he was doing, does not complete tasks and has difficulty remembering to take his medications. His wife reminds him of appointments and helps him to take his medications. Patient was given the RBANS today. Immediate memory, delayed memory and total scale score were all within normal limits. Attention was borderline. Internal and external strategies for memory skills were discussed with the patient. No direct outpatient therapy warranted at this time.  See scores below.      RBANS: The Repeatable Battery for the Assessment of  Neuropsychological Status (RBANS) assesses patient function in the areas of Immediate and Delayed memory, visuospatial/constructional skills, language and attention. It is used to detect and track neurocognitive deficits.    Index score Percentile Qualitative Description   Immediate Memory 81 10 Low Average   Visuospatial 92 30 Average   Language 90 25 Average   Attention 72 3 Borderline   Delayed Memory 86 18 Low Average   Total Scale 80 9 Low Average          LE ARTERIAL STUDIES:IMPRESSION:  Patent bilateral lower extremity arterial systems without  evidence of significant stenosis.        ASSESSMENT/PLAN    Diagnoses and all orders for this visit:    Generalized seizure disorder (CMS/HCC)    Therapeutic drug monitoring  -     Levetiracetam Level (Keppra); Future  -     CBC & Differential; Future  -     Comprehensive Metabolic Panel; Future    JOVAN (obstructive sleep apnea)    Pain in both lower extremities, exertional    Other orders  -     levETIRAcetam (KEPPRA) 1000 MG tablet; Take 1 tablet by mouth 2 (Two) Times a Day.    MEDICAL DECISION MAKIN. Obtain labs, cbc, cmp, keppra level,TODAY  2. continue Keppra 1000 mg BID  3. Gabapentin prescribed by PCP/pain management  4.Discussed tobacco cessation for greater than 3 minutes to include options for cessation and information given for support groups. All questions answered.  5.Seizure precautions were discussed to include no tub baths, no swimming, avoiding lack of sleep, and avoiding known triggers. Education given of things that may contribute to a seizure to include, but not limited to: stressful situations, fever, fatigue, lack of sleep, low blood sugar, hyperventilation, flashing lights, and caffeine. Instructions given to take seizure medications as prescribed. Education given to family member on what to do during a seizure and care following the seizure. Education given to contact this office prior to stopping or changing any medications.  6.patient no  longer using CPAP. States cannot tolerate mask.  7. Patient's Body mass index is 23.63 kg/m². BMI is within normal parameters. No follow-up required..  8. Counseled on importance of keeping appointments.  9. I advised Smith of the risks of continuing to use tobacco, and I provided him with tobacco cessation educational materials in the After Visit Summary.     During this visit, I spent 3-10 minutes counseling the patient regarding tobacco cessation.      HPI and ROS reviewed and updated.     allergies and all known medications/prescriptions have been reviewed using resources available on this encounter.    Return in about 4 months (around 8/8/2019).        Joyce Maki, APRN

## 2019-04-10 LAB — LEVETIRACETAM SERPL-MCNC: 20 UG/ML (ref 10–40)

## 2019-10-01 RX ORDER — LEVETIRACETAM 1000 MG/1
TABLET ORAL
Qty: 180 TABLET | Refills: 0 | Status: SHIPPED | OUTPATIENT
Start: 2019-10-01 | End: 2019-12-27

## 2019-12-23 ENCOUNTER — HOSPITAL ENCOUNTER (EMERGENCY)
Facility: HOSPITAL | Age: 59
Discharge: HOME | End: 2019-12-23
Attending: FAMILY MEDICINE | Admitting: FAMILY MEDICINE

## 2019-12-23 ENCOUNTER — APPOINTMENT (OUTPATIENT)
Dept: CT IMAGING | Facility: HOSPITAL | Age: 59
End: 2019-12-23

## 2019-12-23 VITALS
HEART RATE: 55 BPM | DIASTOLIC BLOOD PRESSURE: 94 MMHG | OXYGEN SATURATION: 93 % | BODY MASS INDEX: 22.22 KG/M2 | HEIGHT: 69 IN | RESPIRATION RATE: 14 BRPM | WEIGHT: 150 LBS | SYSTOLIC BLOOD PRESSURE: 140 MMHG | TEMPERATURE: 97.5 F

## 2019-12-23 DIAGNOSIS — R55 SYNCOPE, UNSPECIFIED SYNCOPE TYPE: Primary | ICD-10-CM

## 2019-12-23 LAB
ALBUMIN SERPL-MCNC: 4.6 G/DL (ref 3.5–5.2)
ALBUMIN/GLOB SERPL: 1.4 G/DL
ALP SERPL-CCNC: 117 U/L (ref 39–117)
ALT SERPL W P-5'-P-CCNC: 31 U/L (ref 1–41)
AMPHET+METHAMPHET UR QL: NEGATIVE
AMPHETAMINES UR QL: NEGATIVE
ANION GAP SERPL CALCULATED.3IONS-SCNC: 12 MMOL/L (ref 5–15)
AST SERPL-CCNC: 29 U/L (ref 1–40)
BARBITURATES UR QL SCN: NEGATIVE
BASOPHILS # BLD AUTO: 0.07 10*3/MM3 (ref 0–0.2)
BASOPHILS NFR BLD AUTO: 0.6 % (ref 0–1.5)
BENZODIAZ UR QL SCN: POSITIVE
BILIRUB SERPL-MCNC: 0.2 MG/DL (ref 0.2–1.2)
BUN BLD-MCNC: 13 MG/DL (ref 8–23)
BUN/CREAT SERPL: 16 (ref 7–25)
BUPRENORPHINE SERPL-MCNC: NEGATIVE NG/ML
CALCIUM SPEC-SCNC: 10 MG/DL (ref 8.6–10.5)
CANNABINOIDS SERPL QL: NEGATIVE
CHLORIDE SERPL-SCNC: 99 MMOL/L (ref 98–107)
CO2 SERPL-SCNC: 31 MMOL/L (ref 22–29)
COCAINE UR QL: NEGATIVE
CREAT BLD-MCNC: 0.81 MG/DL (ref 0.76–1.27)
DEPRECATED RDW RBC AUTO: 46.1 FL (ref 37–54)
EOSINOPHIL # BLD AUTO: 0.26 10*3/MM3 (ref 0–0.4)
EOSINOPHIL NFR BLD AUTO: 2.3 % (ref 0.3–6.2)
ERYTHROCYTE [DISTWIDTH] IN BLOOD BY AUTOMATED COUNT: 14.1 % (ref 12.3–15.4)
GFR SERPL CREATININE-BSD FRML MDRD: 97 ML/MIN/1.73
GLOBULIN UR ELPH-MCNC: 3.3 GM/DL
GLUCOSE BLD-MCNC: 100 MG/DL (ref 65–99)
HCT VFR BLD AUTO: 44.9 % (ref 37.5–51)
HGB BLD-MCNC: 14.7 G/DL (ref 13–17.7)
HOLD SPECIMEN: NORMAL
HOLD SPECIMEN: NORMAL
IMM GRANULOCYTES # BLD AUTO: 0.04 10*3/MM3 (ref 0–0.05)
IMM GRANULOCYTES NFR BLD AUTO: 0.3 % (ref 0–0.5)
LYMPHOCYTES # BLD AUTO: 3.45 10*3/MM3 (ref 0.7–3.1)
LYMPHOCYTES NFR BLD AUTO: 29.9 % (ref 19.6–45.3)
MCH RBC QN AUTO: 29.5 PG (ref 26.6–33)
MCHC RBC AUTO-ENTMCNC: 32.7 G/DL (ref 31.5–35.7)
MCV RBC AUTO: 90 FL (ref 79–97)
METHADONE UR QL SCN: NEGATIVE
MONOCYTES # BLD AUTO: 0.85 10*3/MM3 (ref 0.1–0.9)
MONOCYTES NFR BLD AUTO: 7.4 % (ref 5–12)
NEUTROPHILS # BLD AUTO: 6.88 10*3/MM3 (ref 1.7–7)
NEUTROPHILS NFR BLD AUTO: 59.5 % (ref 42.7–76)
NRBC BLD AUTO-RTO: 0 /100 WBC (ref 0–0.2)
OPIATES UR QL: NEGATIVE
OXYCODONE UR QL SCN: NEGATIVE
PCP UR QL SCN: NEGATIVE
PLATELET # BLD AUTO: 408 10*3/MM3 (ref 140–450)
PMV BLD AUTO: 9.3 FL (ref 6–12)
POTASSIUM BLD-SCNC: 3.7 MMOL/L (ref 3.5–5.2)
PROPOXYPH UR QL: NEGATIVE
PROT SERPL-MCNC: 7.9 G/DL (ref 6–8.5)
RBC # BLD AUTO: 4.99 10*6/MM3 (ref 4.14–5.8)
SODIUM BLD-SCNC: 142 MMOL/L (ref 136–145)
TRICYCLICS UR QL SCN: NEGATIVE
WBC NRBC COR # BLD: 11.55 10*3/MM3 (ref 3.4–10.8)
WHOLE BLOOD HOLD SPECIMEN: NORMAL
WHOLE BLOOD HOLD SPECIMEN: NORMAL

## 2019-12-23 PROCEDURE — 70450 CT HEAD/BRAIN W/O DYE: CPT

## 2019-12-23 PROCEDURE — 96374 THER/PROPH/DIAG INJ IV PUSH: CPT

## 2019-12-23 PROCEDURE — 80307 DRUG TEST PRSMV CHEM ANLYZR: CPT | Performed by: FAMILY MEDICINE

## 2019-12-23 PROCEDURE — 93005 ELECTROCARDIOGRAM TRACING: CPT | Performed by: FAMILY MEDICINE

## 2019-12-23 PROCEDURE — 80053 COMPREHEN METABOLIC PANEL: CPT | Performed by: FAMILY MEDICINE

## 2019-12-23 PROCEDURE — 99284 EMERGENCY DEPT VISIT MOD MDM: CPT

## 2019-12-23 PROCEDURE — 93010 ELECTROCARDIOGRAM REPORT: CPT | Performed by: INTERNAL MEDICINE

## 2019-12-23 PROCEDURE — 85025 COMPLETE CBC W/AUTO DIFF WBC: CPT | Performed by: FAMILY MEDICINE

## 2019-12-23 RX ORDER — FLUOXETINE HYDROCHLORIDE 20 MG/1
40 CAPSULE ORAL DAILY
COMMUNITY

## 2019-12-23 RX ORDER — ACETAMINOPHEN 500 MG
1000 TABLET ORAL ONCE
Status: COMPLETED | OUTPATIENT
Start: 2019-12-23 | End: 2019-12-23

## 2019-12-23 RX ORDER — SODIUM CHLORIDE 0.9 % (FLUSH) 0.9 %
10 SYRINGE (ML) INJECTION AS NEEDED
Status: DISCONTINUED | OUTPATIENT
Start: 2019-12-23 | End: 2019-12-23 | Stop reason: HOSPADM

## 2019-12-23 RX ADMIN — SODIUM CHLORIDE 500 ML: 9 INJECTION, SOLUTION INTRAVENOUS at 12:37

## 2019-12-23 RX ADMIN — HYDROMORPHONE HYDROCHLORIDE 0.5 MG: 1 INJECTION, SOLUTION INTRAMUSCULAR; INTRAVENOUS; SUBCUTANEOUS at 12:36

## 2019-12-23 RX ADMIN — ACETAMINOPHEN 1000 MG: 500 TABLET, FILM COATED ORAL at 15:50

## 2019-12-23 NOTE — ED PROVIDER NOTES
"Subjective   Mr. Montalvo is a 59-year-old gentleman who came to the hospital today because he thought he had an appointment with his neurologist.  It appears there was a confusion and the arrangements for this appointment and is actually on the 28th.  The patient was given some vouchers to go to the cafeteria and on the way down apparently had 1 of his \"episodes\" where he felt dizzy and slowly sank to the ground after leaning up against a wall.  His wife asked for help from the nurse and the patient was brought to the ER.  In the ER is complaining of his chronic low back pain and a headache.  The headache he says is new and different to the one he usually has which is on the left side of his head this was on the right side of the head and he says it is quite severe.          Review of Systems   Musculoskeletal: Positive for back pain.   Neurological: Positive for headaches.   All other systems reviewed and are negative.      Past Medical History:   Diagnosis Date   • Arthritis    • Chronic back pain     [facility]   • Coronary artery disease    • Depression    • Epilepsia (CMS/HCC)    • GERD (gastroesophageal reflux disease)    • Hyperlipidemia    • Hypertension    • Neuropathy    • Orthostatic hypotension    • JOVAN (obstructive sleep apnea)    • Seizures (CMS/HCC)    • Sleep apnea     only uses nasal cannula at night-2-2.5L.       No Known Allergies    Past Surgical History:   Procedure Laterality Date   • AMPUTATION FINGER / THUMB Left    • CARDIAC CATHETERIZATION     • CARDIAC CATHETERIZATION N/A 10/4/2016    Procedure: Left Heart Cath;  Surgeon: Jair Henry MD;  Location: [facility] CATH INVASIVE LOCATION;  Service:    • CORONARY ANGIOPLASTY WITH STENT PLACEMENT     • EYE SURGERY Left    • HERNIA REPAIR     • SHOULDER SURGERY Left        Family History   Problem Relation Age of Onset   • No Known Problems Mother    • No Known Problems Father        Social History     Socioeconomic History "   • Marital status:      Spouse name: Not on file   • Number of children: Not on file   • Years of education: Not on file   • Highest education level: Not on file   Tobacco Use   • Smoking status: Current Every Day Smoker     Packs/day: 0.25     Years: 32.00     Pack years: 8.00     Types: Cigarettes   • Smokeless tobacco: Never Used   Substance and Sexual Activity   • Alcohol use: No   • Drug use: No   • Sexual activity: Defer           Objective   Physical Exam   Constitutional: He is oriented to person, place, and time. He appears well-developed and well-nourished.   HENT:   Head: Normocephalic and atraumatic.   Right Ear: External ear normal.   Left Ear: External ear normal.   Nose: Nose normal.   Mouth/Throat: Oropharynx is clear and moist.   Eyes: Conjunctivae and EOM are normal.   Neck: Normal range of motion. Neck supple.   Cardiovascular: Normal rate, regular rhythm, normal heart sounds and intact distal pulses.   Pulmonary/Chest: Effort normal and breath sounds normal.   Abdominal: Soft. Bowel sounds are normal.   Musculoskeletal: Normal range of motion.   Neurological: He is alert and oriented to person, place, and time.   Skin: Skin is warm and dry. Capillary refill takes less than 2 seconds.   Psychiatric: He has a normal mood and affect. His behavior is normal. Judgment and thought content normal.   Initially the patient when I examined him appeared to be catatonic but quickly became responsive and conversational.   Nursing note and vitals reviewed.      Procedures           ED Course                      No data recorded                        MDM  Number of Diagnoses or Management Options     Amount and/or Complexity of Data Reviewed  Clinical lab tests: reviewed and ordered  Tests in the radiology section of CPT®: ordered and reviewed  Tests in the medicine section of CPT®: reviewed and ordered    Critical Care  Total time providing critical care: < 30 minutes    Patient Progress  Patient  progress: stable      Final diagnoses:   Syncope, unspecified syncope type     Not sure what these episodes are that the patient has but the work-up was entirely negative.  The patient did ask for pain medication which I acquiesced to.  He states that the pain medication did not really help and he wanted some more but I declined.  I wanted to be thorough because of this new aspect of his headache so did a CT scan but that was normal as well.  The patient is discharged in stable condition         Lopez Sheffield MD  12/23/19 [id number]

## 2019-12-27 RX ORDER — LEVETIRACETAM 1000 MG/1
1000 TABLET ORAL 2 TIMES DAILY
Qty: 180 TABLET | Refills: 0 | Status: SHIPPED | OUTPATIENT
Start: 2019-12-27 | End: 2020-03-26

## 2020-01-28 ENCOUNTER — OFFICE VISIT (OUTPATIENT)
Dept: NEUROLOGY | Facility: CLINIC | Age: 60
End: 2020-01-28

## 2020-01-28 DIAGNOSIS — G40.309 GENERALIZED SEIZURE DISORDER (HCC): Primary | ICD-10-CM

## 2020-03-26 RX ORDER — LEVETIRACETAM 1000 MG/1
TABLET ORAL
Qty: 60 TABLET | Refills: 0 | Status: SHIPPED | OUTPATIENT
Start: 2020-03-26

## 2020-04-29 ENCOUNTER — TELEPHONE (OUTPATIENT)
Dept: NEUROLOGY | Facility: CLINIC | Age: 60
End: 2020-04-29

## 2020-04-29 NOTE — TELEPHONE ENCOUNTER
Called patient for telephone visit but got no answer. Left him message and it is ok to reschedule for telephone visit with Lyle Gonzalez.

## 2020-05-01 ENCOUNTER — TELEPHONE (OUTPATIENT)
Dept: NEUROLOGY | Facility: CLINIC | Age: 60
End: 2020-05-01

## 2020-05-01 NOTE — TELEPHONE ENCOUNTER
Patient has been sent a letter of dismissal from the Neurology practice. Please do not reschedule an appointment from here forth.

## 2020-12-04 ENCOUNTER — LAB REQUISITION (OUTPATIENT)
Dept: LAB | Facility: HOSPITAL | Age: 60
End: 2020-12-04

## 2020-12-04 DIAGNOSIS — Z00.00 ENCOUNTER FOR GENERAL ADULT MEDICAL EXAMINATION WITHOUT ABNORMAL FINDINGS: ICD-10-CM

## 2020-12-04 LAB — SARS-COV-2 RNA PNL SPEC NAA+PROBE: NOT DETECTED

## 2020-12-04 PROCEDURE — 87635 SARS-COV-2 COVID-19 AMP PRB: CPT | Performed by: NURSE PRACTITIONER

## 2021-08-24 ENCOUNTER — HOSPITAL ENCOUNTER (EMERGENCY)
Facility: HOSPITAL | Age: 61
Discharge: HOME | End: 2021-08-24
Attending: EMERGENCY MEDICINE | Admitting: EMERGENCY MEDICINE

## 2021-08-24 VITALS
WEIGHT: 184 LBS | RESPIRATION RATE: 17 BRPM | BODY MASS INDEX: 27.25 KG/M2 | DIASTOLIC BLOOD PRESSURE: 79 MMHG | SYSTOLIC BLOOD PRESSURE: 120 MMHG | HEIGHT: 69 IN | HEART RATE: 89 BPM | TEMPERATURE: 98.7 F | OXYGEN SATURATION: 99 %

## 2021-08-24 DIAGNOSIS — U07.1 COVID-19: ICD-10-CM

## 2021-08-24 DIAGNOSIS — R51.9 ACUTE NONINTRACTABLE HEADACHE, UNSPECIFIED HEADACHE TYPE: Primary | ICD-10-CM

## 2021-08-24 LAB — SARS-COV-2 RNA PNL SPEC NAA+PROBE: DETECTED

## 2021-08-24 PROCEDURE — 87635 SARS-COV-2 COVID-19 AMP PRB: CPT | Performed by: EMERGENCY MEDICINE

## 2021-08-24 PROCEDURE — C9803 HOPD COVID-19 SPEC COLLECT: HCPCS | Performed by: EMERGENCY MEDICINE

## 2021-08-24 PROCEDURE — 99283 EMERGENCY DEPT VISIT LOW MDM: CPT
